# Patient Record
Sex: MALE | Race: BLACK OR AFRICAN AMERICAN
[De-identification: names, ages, dates, MRNs, and addresses within clinical notes are randomized per-mention and may not be internally consistent; named-entity substitution may affect disease eponyms.]

---

## 2017-05-30 ENCOUNTER — HOSPITAL ENCOUNTER (EMERGENCY)
Dept: HOSPITAL 62 - ER | Age: 32
LOS: 1 days | Discharge: HOME | End: 2017-05-31
Payer: SELF-PAY

## 2017-05-30 DIAGNOSIS — G43.A1: ICD-10-CM

## 2017-05-30 DIAGNOSIS — F10.10: Primary | ICD-10-CM

## 2017-05-30 DIAGNOSIS — R10.9: ICD-10-CM

## 2017-05-30 DIAGNOSIS — K29.20: ICD-10-CM

## 2017-05-30 DIAGNOSIS — R19.7: ICD-10-CM

## 2017-05-30 PROCEDURE — 99284 EMERGENCY DEPT VISIT MOD MDM: CPT

## 2017-05-30 PROCEDURE — 80053 COMPREHEN METABOLIC PANEL: CPT

## 2017-05-30 PROCEDURE — 82150 ASSAY OF AMYLASE: CPT

## 2017-05-30 PROCEDURE — 83690 ASSAY OF LIPASE: CPT

## 2017-05-30 PROCEDURE — 85025 COMPLETE CBC W/AUTO DIFF WBC: CPT

## 2017-05-30 PROCEDURE — 36415 COLL VENOUS BLD VENIPUNCTURE: CPT

## 2017-05-30 PROCEDURE — 96375 TX/PRO/DX INJ NEW DRUG ADDON: CPT

## 2017-05-30 PROCEDURE — 96361 HYDRATE IV INFUSION ADD-ON: CPT

## 2017-05-30 PROCEDURE — 96365 THER/PROPH/DIAG IV INF INIT: CPT

## 2017-05-30 PROCEDURE — 80307 DRUG TEST PRSMV CHEM ANLYZR: CPT

## 2017-05-30 PROCEDURE — 81001 URINALYSIS AUTO W/SCOPE: CPT

## 2017-05-30 PROCEDURE — S0164 INJECTION PANTROPRAZOLE: HCPCS

## 2017-05-30 NOTE — ER DOCUMENT REPORT
ED General





- General


Chief Complaint: Nausea/Vomiting/Diarrhea


Stated Complaint: NAUSEA,VOMITING,ABDOMINAL PAIN


Time Seen by Provider: 05/30/17 22:29


Mode of Arrival: Ambulatory


Information source: Patient, Relative


TRAVEL OUTSIDE OF THE [place] IN LAST 30 DAYS: No





- HPI


Notes: 


Patient is a 31-year-old black male history of alcohol abuse drinks a sixpack 

plus hard liquor every day, has a history of recurrent vomiting and gastritis 

presents emergency department with report of persistent nausea and vomiting 

since earlier today.  He does describe mild nonbloody diarrhea.  He reports no 

hematemesis.  He states he has diffuse upper abdominal pain.  He states he has 

been seen previous times for the same, most recently down in [place] and 

at an urgent care center.  The patient states she was given Phenergan and 

ibuprofen for pain.


Patient denies any cough congestion chest pain


Or fever.  He describes generalized weakness with sensation of dehydration.





Patient states he drinks daily, but denies history of DTs or shakes or seizures.











- Related Data


Allergies/Adverse Reactions: 


 





No Known Allergies Allergy (Verified 05/30/17 20:01)


 











Past Medical History





- General


Information source: Patient





- Social History


Smoking Status: Current Every Day Smoker


Frequency of alcohol use: Heavy


Drug Abuse: Marijuana


Lives with: Family


Family History: Reviewed & Not Pertinent


Neurological Medical History: Denies: Hx Seizures


Renal/ Medical History: Denies: Hx Peritoneal Dialysis





- Immunizations


Immunizations up to date: Yes


Hx Diphtheria, Pertussis, Tetanus Vaccination: Yes





Review of Systems





- Review of Systems


Notes: 


REVIEW OF SYSTEMS:


CONSTITUTIONAL :  Denies fever,  chills, or sweats.  


EENT:   Denies eye, ear, throat, or mouth pain or symptoms.  Denies nasal or 

sinus congestion or discharge.  Denies throat, tongue, or mouth swelling or 

difficulty swallowing.


CARDIOVASCULAR:  Denies chest pain.  Denies palpitations or racing or irregular 

heart beat.  Denies ankle edema.


RESPIRATORY:  Denies cough, cold, or chest congestion.  Denies shortness of 

breath, difficulty breathing, or wheezing.


GASTROINTESTINAL:   Denies blood in vomitus, stools, or per rectum.  Denies 

black, tarry stools.  Denies constipation.  


GENITOURINARY:  Denies difficulty urinating, painful urination, burning, 

frequency, blood in urine, or discharge.


MUSCULOSKELETAL:  Denies back or neck pain or stiffness.  Denies joint pain or 

swelling.


SKIN:   Denies rash, lesions or sores.


HEMATOLOGIC :   Denies easy bruising or bleeding.


LYMPHATIC:  Denies swollen, enlarged glands.


NEUROLOGICAL:  Denies confusion or altered mental status.  Denies passing out 

or loss of consciousness.  Denies dizziness or lightheadedness.  Denies 

headache.  Denies weakness or paralysis or loss of use of either side.  Denies 

problems with gait or speech.  Denies sensory loss, numbness, or tingling.  

Denies seizures.


PSYCHIATRIC:  Denies anxiety or stress.  Denies depression, suicidal ideation, 

or homicidal ideation.





ALL OTHER SYSTEMS REVIEWED AND NEGATIVE.





Dictation was performed using Dragon voice recognition software





Physical Exam





- Vital signs


Vitals: 


 











Temp Pulse BP Pulse Ox


 


 98.0 F   77   125/83   100 


 


 05/30/17 20:04  05/30/17 20:04  05/30/17 20:04  05/30/17 20:04














- Notes


Notes: 


PHYSICAL EXAMINATION:





GENERAL: Well-appearing, well-nourished and in no moderate distress vomiting 

actively without hematemesis..





HEAD: Atraumatic, normocephalic.





EYES: Pupils equal round and reactive to light, extraocular movements intact, 

sclera anicteric, conjunctiva are normal.





ENT: Nares patent, oropharynx clear without exudates.  Mucous membranes 

somewhat dry.





NECK: Normal range of motion, supple without lymphadenopathy





LUNGS: Breath sounds clear to auscultation bilaterally and equal.  No wheezes 

rales or rhonchi.





HEART: Regular rate and rhythm without murmurs





ABDOMEN: Soft, nondistended abdomen.  No guarding, no rebound.  No masses 

appreciated.  Upper midline abdominal tenderness noted on exam.  No obvious 

hepatosplenomegaly no pulsatile mass.





Musculoskeletal: Normal range of motion, no pitting or edema.  No cyanosis.





NEUROLOGICAL: Cranial nerves grossly intact.  Normal speech, normal gait.  

Normal sensory, motor exams 





PSYCH: Normal mood, normal affect.





SKIN: Warm, Dry, normal turgor, no rashes or lesions noted.





Course





- Re-evaluation


Re-evalutation: 


05/30/17 23:01


Patient was given IV normal saline bolus, Zofran, Protonix, Dilaudid, Ativan.





05/31/17 03:55





Patient was given additional IV normal saline with 20 KCl 1 L.  Patient was 

given Pepcid and Carafate by mouth with adequate relief of his pain.  Repeat 

abdominal exam showed no significant pain and patient was able to tolerate p.o. 

fluids without difficulty.  There was no evidence for DTs or alcohol 

withdrawal.  No evidence for pancreatitis, hepatitis, renal insufficiency, GI 

bleed.


05/31/17 03:59








- Vital Signs


Vital signs: 


 











Temp Pulse Resp BP Pulse Ox


 


 97.9 F   75   16   117/64   97 


 


 05/31/17 02:34  05/31/17 02:34  05/31/17 02:34  05/31/17 02:34  05/31/17 02:34














- Laboratory


Result Diagrams: 


 05/31/17 00:20





 05/31/17 00:20


Laboratory results interpreted by me: 


 











  05/31/17 05/31/17 05/31/17





  00:20 00:20 00:20


 


Seg Neuts % (Manual)  92 H  


 


Lymphocytes % (Manual)  5 L  


 


Abs Neuts (Manual)  8.3 H  


 


Sodium   145.9 H 


 


Potassium   3.4 L 


 


Glucose   120 H 


 


Calcium   10.6 H 


 


Total Protein   9.1 H 


 


Albumin   5.3 H 


 


Urine Protein    30 H


 


Urine Ketones    80 H


 


Ur Leukocyte Esterase    TRACE H














Discharge





- Discharge


Clinical Impression: 


 Alcohol abuse





Gastritis


Qualifiers:


 Gastritis type: alcoholic Chronicity: chronic Gastritis bleeding: without 

bleeding Qualified Code(s): K29.20 - Alcoholic gastritis without bleeding





Vomiting


Qualifiers:


 Vomiting type: cyclical vomiting Vomiting Intractability: intractable Nausea 

presence: with nausea Qualified Code(s): G43.A1 - Cyclical vomiting, intractable





Condition: Stable


Disposition: HOME, SELF-CARE


Instructions:  Antinausea Medication ([facility]), Intravenous (IV) Fluids ([facility]), 

Vomiting ([facility]), Gastritis ([facility])


Additional Instructions: 


Stop ibuprofen   And other anti-inflammatory medications, as they are 

irritating her stomach.








Prescriptions: 


Tramadol HCl [Ultram] 50 mg PO Q4HP PRN #20 tablet


 PRN Reason: 


Ondansetron [Zofran Odt 4 mg Tablet] 1 tab PO Q8HP PRN #10 tab.rapdis


 PRN Reason: For Nausea/Vomiting


Omeprazole 40 mg PO DAILY #60 capsule.

## 2017-05-31 VITALS — SYSTOLIC BLOOD PRESSURE: 108 MMHG | DIASTOLIC BLOOD PRESSURE: 69 MMHG

## 2017-05-31 LAB
ALBUMIN SERPL-MCNC: 5.3 G/DL (ref 3.5–5)
ALP SERPL-CCNC: 68 U/L (ref 38–126)
ALT SERPL-CCNC: 31 U/L (ref 21–72)
AMYLASE SERPL-CCNC: 75 U/L (ref 30–110)
ANION GAP SERPL CALC-SCNC: 18 MMOL/L (ref 5–19)
APPEARANCE UR: (no result)
AST SERPL-CCNC: 40 U/L (ref 17–59)
BARBITURATES UR QL SCN: NEGATIVE
BASOPHILS NFR BLD MANUAL: 0 % (ref 0–2)
BILIRUB DIRECT SERPL-MCNC: 0.4 MG/DL (ref 0–0.4)
BILIRUB SERPL-MCNC: 0.9 MG/DL (ref 0.2–1.3)
BILIRUB UR QL STRIP: NEGATIVE
BUN SERPL-MCNC: 11 MG/DL (ref 7–20)
CALCIUM: 10.6 MG/DL (ref 8.4–10.2)
CHLORIDE SERPL-SCNC: 103 MMOL/L (ref 98–107)
CO2 SERPL-SCNC: 25 MMOL/L (ref 22–30)
CREAT SERPL-MCNC: 0.71 MG/DL (ref 0.52–1.25)
EOSINOPHIL NFR BLD MANUAL: 0 % (ref 0–6)
ERYTHROCYTE [DISTWIDTH] IN BLOOD BY AUTOMATED COUNT: 13.7 % (ref 11.5–14)
ETHANOL SERPL-MCNC: < 10 MG/DL
GLUCOSE SERPL-MCNC: 120 MG/DL (ref 75–110)
GLUCOSE UR STRIP-MCNC: NEGATIVE MG/DL
HCT VFR BLD CALC: 42.2 % (ref 37.9–51)
HGB BLD-MCNC: 14 G/DL (ref 13.5–17)
HGB HCT DIFFERENCE: -0.2
KETONES UR STRIP-MCNC: 80 MG/DL
LIPASE SERPL-CCNC: 43.5 U/L (ref 23–300)
MCH RBC QN AUTO: 31.3 PG (ref 27–33.4)
MCHC RBC AUTO-ENTMCNC: 33.2 G/DL (ref 32–36)
MCV RBC AUTO: 94 FL (ref 80–97)
METHADONE UR QL SCN: NEGATIVE
NITRITE UR QL STRIP: NEGATIVE
PCP UR QL SCN: NEGATIVE
PH UR STRIP: 7 [PH] (ref 5–9)
POTASSIUM SERPL-SCNC: 3.4 MMOL/L (ref 3.6–5)
PROT SERPL-MCNC: 9.1 G/DL (ref 6.3–8.2)
PROT UR STRIP-MCNC: 30 MG/DL
RBC # BLD AUTO: 4.47 10^6/UL (ref 4.35–5.55)
RBC MORPH BLD: (no result)
SODIUM SERPL-SCNC: 145.9 MMOL/L (ref 137–145)
SP GR UR STRIP: 1.03
TOTAL CELLS COUNTED BLD: 100
TOXIC GRANULES BLD QL SMEAR: SLIGHT
URINE OPIATES LOW: NEGATIVE
UROBILINOGEN UR-MCNC: NEGATIVE MG/DL (ref ?–2)
VARIANT LYMPHS NFR BLD MANUAL: 5 % (ref 13–45)
WBC # BLD AUTO: 9 10^3/UL (ref 4–10.5)

## 2017-06-01 ENCOUNTER — HOSPITAL ENCOUNTER (EMERGENCY)
Dept: HOSPITAL 62 - ER | Age: 32
Discharge: HOME | End: 2017-06-01
Payer: SELF-PAY

## 2017-06-01 VITALS — DIASTOLIC BLOOD PRESSURE: 86 MMHG | SYSTOLIC BLOOD PRESSURE: 126 MMHG

## 2017-06-01 DIAGNOSIS — F17.200: ICD-10-CM

## 2017-06-01 DIAGNOSIS — G43.A0: Primary | ICD-10-CM

## 2017-06-01 DIAGNOSIS — R10.9: ICD-10-CM

## 2017-06-01 LAB
ALBUMIN SERPL-MCNC: 4.4 G/DL (ref 3.5–5)
ALP SERPL-CCNC: 55 U/L (ref 38–126)
ALT SERPL-CCNC: 32 U/L (ref 21–72)
ANION GAP SERPL CALC-SCNC: 15 MMOL/L (ref 5–19)
APPEARANCE UR: CLEAR
AST SERPL-CCNC: 24 U/L (ref 17–59)
BASOPHILS # BLD AUTO: 0 10^3/UL (ref 0–0.2)
BASOPHILS NFR BLD AUTO: 0.3 % (ref 0–2)
BILIRUB DIRECT SERPL-MCNC: 0.3 MG/DL (ref 0–0.4)
BILIRUB SERPL-MCNC: 0.6 MG/DL (ref 0.2–1.3)
BILIRUB UR QL STRIP: NEGATIVE
BUN SERPL-MCNC: 9 MG/DL (ref 7–20)
CALCIUM: 9.4 MG/DL (ref 8.4–10.2)
CHLORIDE SERPL-SCNC: 106 MMOL/L (ref 98–107)
CO2 SERPL-SCNC: 26 MMOL/L (ref 22–30)
CREAT SERPL-MCNC: 0.73 MG/DL (ref 0.52–1.25)
EOSINOPHIL # BLD AUTO: 0 10^3/UL (ref 0–0.6)
EOSINOPHIL NFR BLD AUTO: 0 % (ref 0–6)
ERYTHROCYTE [DISTWIDTH] IN BLOOD BY AUTOMATED COUNT: 13.5 % (ref 11.5–14)
GLUCOSE SERPL-MCNC: 92 MG/DL (ref 75–110)
GLUCOSE UR STRIP-MCNC: NEGATIVE MG/DL
HCT VFR BLD CALC: 40.6 % (ref 37.9–51)
HGB BLD-MCNC: 13.4 G/DL (ref 13.5–17)
HGB HCT DIFFERENCE: -0.4
KETONES UR STRIP-MCNC: (no result) MG/DL
LIPASE SERPL-CCNC: 68.7 U/L (ref 23–300)
LYMPHOCYTES # BLD AUTO: 0.8 10^3/UL (ref 0.5–4.7)
LYMPHOCYTES NFR BLD AUTO: 9 % (ref 13–45)
MCH RBC QN AUTO: 31.6 PG (ref 27–33.4)
MCHC RBC AUTO-ENTMCNC: 32.9 G/DL (ref 32–36)
MCV RBC AUTO: 96 FL (ref 80–97)
MONOCYTES # BLD AUTO: 0.5 10^3/UL (ref 0.1–1.4)
MONOCYTES NFR BLD AUTO: 5.8 % (ref 3–13)
NEUTROPHILS # BLD AUTO: 7.1 10^3/UL (ref 1.7–8.2)
NEUTS SEG NFR BLD AUTO: 84.9 % (ref 42–78)
NITRITE UR QL STRIP: NEGATIVE
PH UR STRIP: 6 [PH] (ref 5–9)
POTASSIUM SERPL-SCNC: 3.5 MMOL/L (ref 3.6–5)
PROT SERPL-MCNC: 7.4 G/DL (ref 6.3–8.2)
PROT UR STRIP-MCNC: NEGATIVE MG/DL
RBC # BLD AUTO: 4.24 10^6/UL (ref 4.35–5.55)
SODIUM SERPL-SCNC: 146.5 MMOL/L (ref 137–145)
SP GR UR STRIP: 1.02
UROBILINOGEN UR-MCNC: NEGATIVE MG/DL (ref ?–2)
WBC # BLD AUTO: 8.4 10^3/UL (ref 4–10.5)

## 2017-06-01 PROCEDURE — 36415 COLL VENOUS BLD VENIPUNCTURE: CPT

## 2017-06-01 PROCEDURE — 83690 ASSAY OF LIPASE: CPT

## 2017-06-01 PROCEDURE — 85025 COMPLETE CBC W/AUTO DIFF WBC: CPT

## 2017-06-01 PROCEDURE — 99283 EMERGENCY DEPT VISIT LOW MDM: CPT

## 2017-06-01 PROCEDURE — 80053 COMPREHEN METABOLIC PANEL: CPT

## 2017-06-01 PROCEDURE — 81001 URINALYSIS AUTO W/SCOPE: CPT

## 2017-06-01 PROCEDURE — 96365 THER/PROPH/DIAG IV INF INIT: CPT

## 2017-06-01 PROCEDURE — 96375 TX/PRO/DX INJ NEW DRUG ADDON: CPT

## 2017-06-01 PROCEDURE — 96361 HYDRATE IV INFUSION ADD-ON: CPT

## 2017-06-01 NOTE — ER DOCUMENT REPORT
ED GI/





- General


Mode of Arrival: Ambulatory


Information source: Patient


TRAVEL OUTSIDE OF THE [place] IN LAST 30 DAYS: No





- HPI


Patient complains to provider of: Abdominal pain, Vomiting


Onset: Other - Refer to HPI notes


Associated symptoms: Nausea


Similar symptoms previously: No


Recently seen / treated by doctor: No





<JOANN JARRELL - Last Filed: 06/01/17 12:10>





<JOSH WALDEN - Last Filed: 06/01/17 16:43>





- General


Chief Complaint: Abdominal Pain


Stated Complaint: NAUSEA


Time Seen by Provider: 06/01/17 11:04


Notes: 


Patient is a 31-year-old male presenting to the emergency department for an 

ongoing episode of nausea, vomiting, and abdominal pain.  Patient states that 

the episode started on Tuesday (5/30/17) and patient was evaluated [facility] emergency department on Tuesday night (5/30/17).  Patient 

tested positive for marijuana use on 5/30/17.  Patient states that these 

vomiting and abdominal pain episodes happen frequently. Patient has been told 

that his marijuana use may be the cause of these episodes. Cyclic vomiting 

syndrome was discussed in detail with patient and patient's significant other 

at the bedside. Patient also drinks EtOH heavily as well as smokes cigarettes. 

Patient has no known drug allergies. (JOANN JARRELL)





- Related Data


Allergies/Adverse Reactions: 


 





No Known Allergies Allergy (Verified 06/01/17 10:45)


 











Past Medical History





- General


Information source: Patient





- Social History


Smoking Status: Current Every Day Smoker


Chew tobacco use (# tins/day): No


Frequency of alcohol use: Heavy


Drug Abuse: Marijuana


Family History: None


Patient has suicidal ideation: No


Patient has homicidal ideation: No





- Medical History


Medical History: Negative


Surgical Hx: Negative





- Immunizations


Immunizations up to date: Yes


Hx Diphtheria, Pertussis, Tetanus Vaccination: Yes





<JOANN JARRELL - Last Filed: 06/01/17 12:10>





Review of Systems





- Review of Systems


Constitutional: No symptoms reported


EENT: No symptoms reported


Cardiovascular: No symptoms reported


Respiratory: No symptoms reported


Gastrointestinal: See HPI, Abdominal pain, Nausea, Vomiting


Genitourinary: No symptoms reported


Male Genitourinary: No symptoms reported


Musculoskeletal: No symptoms reported


Skin: No symptoms reported


Hematologic/Lymphatic: No symptoms reported


Neurological/Psychological: No symptoms reported


-: Yes All other systems reviewed and negative





<JOANN JARRELL - Last Filed: 06/01/17 12:10>





Physical Exam





- Vital signs


Interpretation: Normal





- General


General appearance: Appears well, Alert


In distress: Mild





- HEENT


Head: Normocephalic, Atraumatic


Eyes: Normal


Pupils: PERRL


Mucous membranes: Moist





- Respiratory


Respiratory status: No respiratory distress


Chest status: Nontender


Breath sounds: Normal


Chest palpation: Normal





- Cardiovascular


Rhythm: Regular


Heart sounds: Normal auscultation


Murmur: No





- Abdominal


Inspection: Normal


Distension: No distension


Bowel sounds: Normal


Tenderness: Guarding


Organomegaly: No organomegaly





- Back


Back: Normal, Nontender





- Extremities


General upper extremity: Normal inspection, Normal ROM, Normal strength


General lower extremity: Normal inspection, Normal ROM, Normal strength





- Neurological


Neuro grossly intact: Yes


Cognition: Normal


Orientation: AAOx4


[place]io Coma Scale Eye Opening: Spontaneous


Ambrocio Coma Scale Verbal: Oriented


[place]rocio Coma Scale Motor: Obeys Commands


[place] Coma Scale Total: 15


Speech: Normal





- Psychological


Associated symptoms: Normal affect, Normal mood





- Skin


Skin Temperature: Warm


Skin Moisture: Dry





<NENONEOJOANN - Last Filed: 06/01/17 12:10>





<JOSH WALDEN - Last Filed: 06/01/17 16:43>





- Vital signs


Vitals: 


 











Temp Pulse Resp BP Pulse Ox


 


 98.1 F   84   20   149/101 H  99 


 


 06/01/17 10:45  06/01/17 10:45  06/01/17 10:45  06/01/17 10:45  06/01/17 10:45














Course





<NOLAN JARRELLINE - Last Filed: 06/01/17 12:10>





- Laboratory


Result Diagrams: 


 06/01/17 13:57





 06/01/17 13:57





<JOSH WALDEN - Last Filed: 06/01/17 16:43>





- Re-evaluation


Re-evalutation: 





06/01/17 16:21


After the Reglan and Benadryl, the patient feels considerably better.  IV 

fluids are infusing.  Of note, his urine did show calcium oxalate crystals and 

he was counseled about the need to hydrate well throughout the year to prevent 

forming kidney stones.


Discussed the need to stop using marijuana as this is probably cyclic vomiting 

related to marijuana use, and it will probably get worse over time. (JOSH WALDEN)





- Vital Signs


Vital signs: 


 











Temp Pulse Resp BP Pulse Ox


 


 98.1 F   85   20   149/101 H  100 


 


 06/01/17 10:46  06/01/17 10:46  06/01/17 10:46  06/01/17 10:46  06/01/17 10:46














- Laboratory


Laboratory results interpreted by me: 


 











  06/01/17 06/01/17 06/01/17





  13:19 13:57 13:57


 


RBC   4.24 L 


 


Hgb   13.4 L 


 


Seg Neutrophils %   84.9 H 


 


Lymphocytes %   9.0 L 


 


Sodium    146.5 H


 


Potassium    3.5 L


 


Urine Ketones  TRACE H  


 


Urine Blood  SMALL H  














Discharge





<JOANN JARRELL - Last Filed: 06/01/17 12:10>





<JOSH WALDEN - Last Filed: 06/01/17 16:43>





- Discharge


Clinical Impression: 


Cyclic vomiting syndrome


Qualifiers:


 Vomiting Intractability: non-intractable Nausea presence: with nausea 

Qualified Code(s): G43.A0 - Cyclical vomiting, not intractable





Condition: Stable


Disposition: HOME, SELF-CARE


Additional Instructions: 


Vomiting:





     Vomiting can be part of many illnesses.  Most cases of vomiting are due to 

gastroenteritis, usually a viral infection in the intestinal tract.  There is 

no specific treatment.  The disease will end by itself.  For now, the main 

danger to your child is dehydration.


     During the first few hours of the illness, give clear liquids, such as 

Pedialyte.  Try to give small quantities frequently, such as a teaspoon of 

liquid every minute or about an ounce of fluids every five to ten minutes.


     Medications may be prescribed by the physician for special cases.  After 

an hour or two of fluids without vomiting, add rice cereal, toast, applesauce, 

or bananas and other more solid foods to the clear liquids.


     Call the physician or go to the hospital if vomiting increases or blood 

appears in the bowel movement or vomitus; if your child fails to improve, or if 

signs of dehydration occur (no wet diapers for eight to twelve hours, tongue 

and mouth become dry, not acting as alert as usual).





////////////////////////////////////////////////////////////////////////////////

///////////////////////////////////////////////////////////





Your vomiting is quite likely "cyclic vomiting" due to marijuana use.


Take the Reglan as prescribed for nausea.  Take one Benadryl with the Reglan 

for additional nausea control.


Drink plenty of cool clear liquids.


Stop using marijuana.


Follow-up with a local medical doctor if not improving.





RETURN TO THE EMERGENCY ROOM IF ANY NEW OR WORSENING SYMPTOMS.








Prescriptions: 


Metoclopramide HCl [Reglan 10 mg Tablet] 10 mg PO Q4 PRN #20 tablet


 PRN Reason: For Nausea/Vomiting


Scribe Attestation: 





06/01/17 16:43


I personally performed the services described in the documentation, reviewed 

and edited the documentation which was dictated to the scribe in my presence, 

and it accurately records my words and actions. (JOSH WALDEN)





Scribe Documentation





- Scribe


Written by Arik:: Arik Fernandez, 6/1/17 12:19


acting as scribe for :: Tegan





<JOANN JARRELL - Last Filed: 06/01/17 12:10>

## 2017-06-01 NOTE — ER DOCUMENT REPORT
ED Medical Screen (RME)





- General


Chief Complaint: Abdominal Pain


Stated Complaint: NAUSEA


Time Seen by Provider: 06/01/17 11:04


Mode of Arrival: Ambulatory


Information source: Patient


Notes: 


Patient presents to the emergency department actively vomiting.  Patient was 

evaluated and treated here on May 30 for same symptoms.  He did not  his 

prescriptions.  He reports he still vomiting.


TRAVEL OUTSIDE OF THE [place] IN LAST 30 DAYS: No





- Related Data


Allergies/Adverse Reactions: 


 





No Known Allergies Allergy (Verified 06/01/17 10:45)


 











Past Medical History


Neurological Medical History: Denies: Hx Seizures


Renal/ Medical History: Denies: Hx Peritoneal Dialysis





- Immunizations


Immunizations up to date: Yes


Hx Diphtheria, Pertussis, Tetanus Vaccination: Yes





Physical Exam





- Vital signs


Vitals: 





 











Temp Pulse Resp BP Pulse Ox


 


 98.1 F   84   20   149/101 H  99 


 


 06/01/17 10:45  06/01/17 10:45  06/01/17 10:45  06/01/17 10:45  06/01/17 10:45














Course





- Vital Signs


Vital signs: 





 











Temp Pulse Resp BP Pulse Ox


 


 98.1 F   84   20   149/101 H  99 


 


 06/01/17 10:45  06/01/17 10:45  06/01/17 10:45  06/01/17 10:45  06/01/17 10:45

## 2017-06-02 ENCOUNTER — HOSPITAL ENCOUNTER (EMERGENCY)
Dept: HOSPITAL 62 - ER | Age: 32
Discharge: HOME | End: 2017-06-02
Payer: SELF-PAY

## 2017-06-02 VITALS — DIASTOLIC BLOOD PRESSURE: 89 MMHG | SYSTOLIC BLOOD PRESSURE: 136 MMHG

## 2017-06-02 DIAGNOSIS — F10.10: ICD-10-CM

## 2017-06-02 DIAGNOSIS — F17.200: ICD-10-CM

## 2017-06-02 DIAGNOSIS — G43.A1: Primary | ICD-10-CM

## 2017-06-02 DIAGNOSIS — R10.9: ICD-10-CM

## 2017-06-02 LAB
ANION GAP SERPL CALC-SCNC: 15 MMOL/L (ref 5–19)
APPEARANCE UR: CLEAR
BASOPHILS # BLD AUTO: 0 10^3/UL (ref 0–0.2)
BASOPHILS NFR BLD AUTO: 0.3 % (ref 0–2)
BILIRUB UR QL STRIP: NEGATIVE
BUN SERPL-MCNC: 3 MG/DL (ref 7–20)
CALCIUM: 9.7 MG/DL (ref 8.4–10.2)
CHLORIDE SERPL-SCNC: 103 MMOL/L (ref 98–107)
CO2 SERPL-SCNC: 24 MMOL/L (ref 22–30)
CREAT SERPL-MCNC: 0.69 MG/DL (ref 0.52–1.25)
EOSINOPHIL # BLD AUTO: 0 10^3/UL (ref 0–0.6)
EOSINOPHIL NFR BLD AUTO: 0.2 % (ref 0–6)
ERYTHROCYTE [DISTWIDTH] IN BLOOD BY AUTOMATED COUNT: 13.5 % (ref 11.5–14)
GLUCOSE SERPL-MCNC: 100 MG/DL (ref 75–110)
GLUCOSE UR STRIP-MCNC: NEGATIVE MG/DL
HCT VFR BLD CALC: 36.2 % (ref 37.9–51)
HGB BLD-MCNC: 12.1 G/DL (ref 13.5–17)
HGB HCT DIFFERENCE: 0.1
KETONES UR STRIP-MCNC: NEGATIVE MG/DL
LIPASE SERPL-CCNC: 60.9 U/L (ref 23–300)
LYMPHOCYTES # BLD AUTO: 0.9 10^3/UL (ref 0.5–4.7)
LYMPHOCYTES NFR BLD AUTO: 14.5 % (ref 13–45)
MCH RBC QN AUTO: 32 PG (ref 27–33.4)
MCHC RBC AUTO-ENTMCNC: 33.4 G/DL (ref 32–36)
MCV RBC AUTO: 96 FL (ref 80–97)
MONOCYTES # BLD AUTO: 0.5 10^3/UL (ref 0.1–1.4)
MONOCYTES NFR BLD AUTO: 8.3 % (ref 3–13)
NEUTROPHILS # BLD AUTO: 5 10^3/UL (ref 1.7–8.2)
NEUTS SEG NFR BLD AUTO: 76.7 % (ref 42–78)
NITRITE UR QL STRIP: NEGATIVE
PH UR STRIP: 7 [PH] (ref 5–9)
POTASSIUM SERPL-SCNC: 3.1 MMOL/L (ref 3.6–5)
PROT UR STRIP-MCNC: NEGATIVE MG/DL
RBC # BLD AUTO: 3.78 10^6/UL (ref 4.35–5.55)
SODIUM SERPL-SCNC: 142.3 MMOL/L (ref 137–145)
SP GR UR STRIP: 1.01
UROBILINOGEN UR-MCNC: NEGATIVE MG/DL (ref ?–2)
WBC # BLD AUTO: 6.5 10^3/UL (ref 4–10.5)

## 2017-06-02 PROCEDURE — 80048 BASIC METABOLIC PNL TOTAL CA: CPT

## 2017-06-02 PROCEDURE — 85025 COMPLETE CBC W/AUTO DIFF WBC: CPT

## 2017-06-02 PROCEDURE — 74000: CPT

## 2017-06-02 PROCEDURE — 36415 COLL VENOUS BLD VENIPUNCTURE: CPT

## 2017-06-02 PROCEDURE — S0119 ONDANSETRON 4 MG: HCPCS

## 2017-06-02 PROCEDURE — 96374 THER/PROPH/DIAG INJ IV PUSH: CPT

## 2017-06-02 PROCEDURE — 83690 ASSAY OF LIPASE: CPT

## 2017-06-02 PROCEDURE — 99284 EMERGENCY DEPT VISIT MOD MDM: CPT

## 2017-06-02 PROCEDURE — 96372 THER/PROPH/DIAG INJ SC/IM: CPT

## 2017-06-02 PROCEDURE — 81001 URINALYSIS AUTO W/SCOPE: CPT

## 2017-06-02 PROCEDURE — 80307 DRUG TEST PRSMV CHEM ANLYZR: CPT

## 2017-06-02 NOTE — ER DOCUMENT REPORT
ED GI/





- General


Chief Complaint: Abdominal Pain


Stated Complaint: ABDOMINAL PAIN


Time Seen by Provider: 06/02/17 03:26


Notes: 


patient is 31 year old male who presents with abdominal pain he has been 

evaluated in the emergency department 3 times over the past couple of days for 

cyclic vomiting and alcohol abuse.  Been noncompliant with clear liquid diet, 

ceasing from drinking alcohol or smoking marijuana.  Patient states has not 

been taking his medications at home.  He is also here today for referral for a 

detox program


TRAVEL OUTSIDE OF THE [place] IN LAST 30 DAYS: No





- Related Data


Allergies/Adverse Reactions: 


 





No Known Allergies Allergy (Verified 06/01/17 10:45)


 











Past Medical History





- Social History


Smoking Status: Current Every Day Smoker


Drug Abuse: Marijuana


Family History: None


Patient has suicidal ideation: No


Patient has homicidal ideation: No


Neurological Medical History: Denies: Hx Seizures


Renal/ Medical History: Denies: Hx Peritoneal Dialysis





- Immunizations


Immunizations up to date: Yes


Hx Diphtheria, Pertussis, Tetanus Vaccination: Yes





Review of Systems





- Review of Systems


Constitutional: No symptoms reported


Gastrointestinal: Abdominal pain, Nausea, Vomiting


-: Yes All other systems reviewed and negative





Physical Exam





- Vital signs


Vitals: 


 











Temp Pulse Resp BP Pulse Ox


 


 97.8 F   79   16   167/99 H  100 


 


 06/02/17 02:43  06/02/17 02:43  06/02/17 02:43  06/02/17 02:43  06/02/17 02:43














- Notes


Notes: 


PHYSICAL EXAM


GENERAL: Alert, interacts well. 


HEAD: Normocephalic, atraumatic.


EYES: Pupils equal, round, and reactive to light. Extraocular movements intact.


ENT: Oral mucosa moist, tongue midline. 


NECK: Full range of motion. Supple. Trachea midline.


LUNGS: Clear to auscultation bilaterally, no wheezes, rales, or rhonchi. No 

respiratory distress.


HEART: Regular rate and rhythm. No murmurs, gallops, or rubs.


ABDOMEN: Soft,  nondistended, throughout with guarding. No rebound, or 

rigidity.. Bowel sounds present in all 4 quadrants.


EXTREMITIES: Moves all 4 extremities spontaneously. No edema, radial and 

dorsalis pedis pulses 2/4 bilaterally. No cyanosis. 


NEUROLOGICAL: Alert and oriented x4. Normal speech.


PSYCH: Normal affect, normal mood.


SKIN: Warm, dry, normal turgor. No rashes or lesions noted.








Course





- Re-evaluation


Re-evalutation: 


06/02/17 07:43


Patient is a 31-year-old male who presents with cyclic vomiting after chronic 

alcohol and marijuana use.  Labs do not reveal any concern for acute dehydration

, infection.  Patient treated for cannabinoid hyperemesis.  Resources for 

outpatient alcohol treatment.  Patient agrees with treatment plan





Per [facility] protocol and guidelines, this case was discussed with supervising 

physician Dr. BJ Alejandre prior to discharge





- Vital Signs


Vital signs: 


 











Temp Pulse Resp BP Pulse Ox


 


 97.8 F   79   16   167/99 H  100 


 


 06/02/17 02:43  06/02/17 02:43  06/02/17 02:43  06/02/17 02:43  06/02/17 02:43














- Laboratory


Result Diagrams: 


 06/02/17 04:15





 06/02/17 04:15


Laboratory results interpreted by me: 


 











  06/02/17 06/02/17





  04:15 04:15


 


RBC  3.78 L 


 


Hgb  12.1 L 


 


Hct  36.2 L 


 


Potassium   3.1 L


 


BUN   3 L














- Diagnostic Test


Radiology reviewed: Image reviewed, Reports reviewed





Discharge





- Discharge


Clinical Impression: 


 Alcohol abuse





Vomiting


Qualifiers:


 Vomiting type: cyclical vomiting Vomiting Intractability: intractable Nausea 

presence: with nausea Qualified Code(s): G43.A1 - Cyclical vomiting, intractable





Condition: Good


Disposition: HOME, SELF-CARE


Instructions:  Vomiting ([facility]), Chronic Alcoholism ([facility])


Additional Instructions: 


Your vomiting is quite likely "cyclic vomiting" due to marijuana use.


Take the Reglan as prescribed for nausea.  Take one Benadryl with the Reglan 

for additional nausea control.


Drink plenty of cool clear liquids.


Stop using marijuana.


Follow-up with a local medical doctor if not improving.








Forms:  Elevated Blood Pressure


Referrals: 


[facility] [Provider Group] - Follow up as needed

## 2017-06-02 NOTE — RADIOLOGY REPORT (SQ)
EXAM DESCRIPTION:  KUB/ABDOMEN (SINGLE VIEW)



COMPLETED DATE/TIME:  6/2/2017 4:42 am



REASON FOR STUDY:  abdominal pain



COMPARISON:  CT abdomen and pelvis 10/22/2016



NUMBER OF VIEWS:  One view.



TECHNIQUE:   Supine radiographic image of the abdomen acquired.



LIMITATIONS:  None.



FINDINGS:  BOWEL GAS PATTERN: Nonobstructive bowel gas pattern. No dilated loops.

CALCIFICATIONS: No suspicious calcifications.

SOFT TISSUES: No gross mass or suggestion of organomegaly.

HARDWARE: None in the abdomen.

BONES: No acute findings.



IMPRESSION:  Nonobstructive bowel gas pattern.



TECHNICAL DOCUMENTATION:  JOB ID:  [id number]

[id number]

 2011 [employer]- All Rights Reserved

## 2017-06-19 ENCOUNTER — HOSPITAL ENCOUNTER (EMERGENCY)
Dept: HOSPITAL 62 - ER | Age: 32
Discharge: HOME | End: 2017-06-19
Payer: SELF-PAY
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
## 2017-06-19 DIAGNOSIS — S51.811A: ICD-10-CM

## 2017-06-19 DIAGNOSIS — G89.29: ICD-10-CM

## 2017-06-19 DIAGNOSIS — S01.01XA: ICD-10-CM

## 2017-06-19 DIAGNOSIS — R10.9: Primary | ICD-10-CM

## 2017-06-19 DIAGNOSIS — S41.112A: ICD-10-CM

## 2017-06-19 LAB
ALBUMIN SERPL-MCNC: 4.6 G/DL (ref 3.5–5)
ALP SERPL-CCNC: 43 U/L (ref 38–126)
ALT SERPL-CCNC: 51 U/L (ref 21–72)
ANION GAP SERPL CALC-SCNC: 17 MMOL/L (ref 5–19)
APTT BLD: 24.4 SEC (ref 23.5–35.8)
AST SERPL-CCNC: 45 U/L (ref 17–59)
BASOPHILS # BLD AUTO: 0 10^3/UL (ref 0–0.2)
BASOPHILS NFR BLD AUTO: 0.3 % (ref 0–2)
BILIRUB DIRECT SERPL-MCNC: 0.3 MG/DL (ref 0–0.4)
BILIRUB SERPL-MCNC: 0.5 MG/DL (ref 0.2–1.3)
BUN SERPL-MCNC: 11 MG/DL (ref 7–20)
CALCIUM: 9.6 MG/DL (ref 8.4–10.2)
CHLORIDE SERPL-SCNC: 107 MMOL/L (ref 98–107)
CO2 SERPL-SCNC: 21 MMOL/L (ref 22–30)
CREAT SERPL-MCNC: 0.83 MG/DL (ref 0.52–1.25)
EOSINOPHIL # BLD AUTO: 0 10^3/UL (ref 0–0.6)
EOSINOPHIL NFR BLD AUTO: 0.2 % (ref 0–6)
ERYTHROCYTE [DISTWIDTH] IN BLOOD BY AUTOMATED COUNT: 13.4 % (ref 11.5–14)
ETHANOL SERPL-MCNC: 171 MG/DL
GLUCOSE SERPL-MCNC: 98 MG/DL (ref 75–110)
HCT VFR BLD CALC: 39.1 % (ref 37.9–51)
HGB BLD-MCNC: 12.9 G/DL (ref 13.5–17)
HGB HCT DIFFERENCE: -0.4
LYMPHOCYTES # BLD AUTO: 1 10^3/UL (ref 0.5–4.7)
LYMPHOCYTES NFR BLD AUTO: 13.3 % (ref 13–45)
MCH RBC QN AUTO: 31.5 PG (ref 27–33.4)
MCHC RBC AUTO-ENTMCNC: 33.1 G/DL (ref 32–36)
MCV RBC AUTO: 95 FL (ref 80–97)
MONOCYTES # BLD AUTO: 0.6 10^3/UL (ref 0.1–1.4)
MONOCYTES NFR BLD AUTO: 7.7 % (ref 3–13)
NEUTROPHILS # BLD AUTO: 6.1 10^3/UL (ref 1.7–8.2)
NEUTS SEG NFR BLD AUTO: 78.5 % (ref 42–78)
POTASSIUM SERPL-SCNC: 3.9 MMOL/L (ref 3.6–5)
PROT SERPL-MCNC: 7.6 G/DL (ref 6.3–8.2)
PROTHROMBIN TIME: 12.9 SEC (ref 11.4–15.4)
RBC # BLD AUTO: 4.1 10^6/UL (ref 4.35–5.55)
SODIUM SERPL-SCNC: 145 MMOL/L (ref 137–145)
WBC # BLD AUTO: 7.8 10^3/UL (ref 4–10.5)

## 2017-06-19 PROCEDURE — 12004 RPR S/N/AX/GEN/TRK7.6-12.5CM: CPT

## 2017-06-19 PROCEDURE — 13122 CMPLX RPR S/A/L ADDL 5 CM/>: CPT

## 2017-06-19 PROCEDURE — 71010: CPT

## 2017-06-19 PROCEDURE — 0HQ0XZZ REPAIR SCALP SKIN, EXTERNAL APPROACH: ICD-10-PCS | Performed by: EMERGENCY MEDICINE

## 2017-06-19 PROCEDURE — 0KQ90ZZ REPAIR RIGHT LOWER ARM AND WRIST MUSCLE, OPEN APPROACH: ICD-10-PCS | Performed by: EMERGENCY MEDICINE

## 2017-06-19 PROCEDURE — 70450 CT HEAD/BRAIN W/O DYE: CPT

## 2017-06-19 PROCEDURE — 86850 RBC ANTIBODY SCREEN: CPT

## 2017-06-19 PROCEDURE — 99285 EMERGENCY DEPT VISIT HI MDM: CPT

## 2017-06-19 PROCEDURE — 85730 THROMBOPLASTIN TIME PARTIAL: CPT

## 2017-06-19 PROCEDURE — 86900 BLOOD TYPING SEROLOGIC ABO: CPT

## 2017-06-19 PROCEDURE — 80307 DRUG TEST PRSMV CHEM ANLYZR: CPT

## 2017-06-19 PROCEDURE — 85610 PROTHROMBIN TIME: CPT

## 2017-06-19 PROCEDURE — 85025 COMPLETE CBC W/AUTO DIFF WBC: CPT

## 2017-06-19 PROCEDURE — 80053 COMPREHEN METABOLIC PANEL: CPT

## 2017-06-19 PROCEDURE — 73090 X-RAY EXAM OF FOREARM: CPT

## 2017-06-19 PROCEDURE — 86901 BLOOD TYPING SEROLOGIC RH(D): CPT

## 2017-06-19 PROCEDURE — 36415 COLL VENOUS BLD VENIPUNCTURE: CPT

## 2017-06-19 PROCEDURE — L3984 UPPER EXT FX ORTHOSIS WRIST: HCPCS

## 2017-06-19 PROCEDURE — 13121 CMPLX RPR S/A/L 2.6-7.5 CM: CPT

## 2017-06-19 PROCEDURE — 0HQCXZZ REPAIR LEFT UPPER ARM SKIN, EXTERNAL APPROACH: ICD-10-PCS | Performed by: EMERGENCY MEDICINE

## 2017-06-19 NOTE — RADIOLOGY REPORT (SQ)
EXAM DESCRIPTION:  FOREARM RIGHT



COMPLETED DATE/TIME:  6/19/2017 5:39 pm



REASON FOR STUDY:  right forearm injury



COMPARISON:  None.



NUMBER OF VIEWS:  Two views.



TECHNIQUE:  Two radiographic images acquired of the right forearm, including elbow and wrist in at le
ast one projection.



LIMITATIONS:  None.



FINDINGS:  MINERALIZATION: Normal.

BONES: No acute fracture.  No worrisome bone lesions.

SOFT TISSUES: There appears to be a soft tissue injury adjacent to the radius and on the dorsum of th
e 4.  No osseous abnormality is seen.

OTHER: No other significant finding.



IMPRESSION:  No osseous abnormality is present.



TECHNICAL DOCUMENTATION:  JOB ID:  [id number]

 2011 [employer]- All Rights Reserved
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
## 2017-06-22 ENCOUNTER — HOSPITAL ENCOUNTER (EMERGENCY)
Dept: HOSPITAL 62 - ER | Age: 32
Discharge: HOME | End: 2017-06-22
Payer: SELF-PAY

## 2017-06-22 VITALS — DIASTOLIC BLOOD PRESSURE: 91 MMHG | SYSTOLIC BLOOD PRESSURE: 125 MMHG
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
## 2017-06-22 DIAGNOSIS — I10: ICD-10-CM

## 2017-06-22 DIAGNOSIS — L03.113: ICD-10-CM

## 2017-06-22 DIAGNOSIS — S51.811D: ICD-10-CM

## 2017-06-22 DIAGNOSIS — S01.01XD: Primary | ICD-10-CM

## 2017-06-22 PROCEDURE — 99282 EMERGENCY DEPT VISIT SF MDM: CPT

## 2017-06-22 PROCEDURE — 96372 THER/PROPH/DIAG INJ SC/IM: CPT
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
## 2017-12-29 ENCOUNTER — HOSPITAL ENCOUNTER (INPATIENT)
Dept: HOSPITAL 62 - ER | Age: 32
LOS: 6 days | Discharge: HOME | DRG: 392 | End: 2018-01-04
Attending: INTERNAL MEDICINE | Admitting: INTERNAL MEDICINE
Payer: SELF-PAY

## 2017-12-29 DIAGNOSIS — K52.9: ICD-10-CM

## 2017-12-29 DIAGNOSIS — I10: ICD-10-CM

## 2017-12-29 DIAGNOSIS — F17.210: ICD-10-CM

## 2017-12-29 DIAGNOSIS — E87.2: ICD-10-CM

## 2017-12-29 DIAGNOSIS — K51.00: ICD-10-CM

## 2017-12-29 DIAGNOSIS — K29.20: Primary | ICD-10-CM

## 2017-12-29 DIAGNOSIS — F12.10: ICD-10-CM

## 2017-12-29 DIAGNOSIS — F10.229: ICD-10-CM

## 2017-12-29 DIAGNOSIS — K21.9: ICD-10-CM

## 2017-12-29 LAB
ADD MANUAL DIFF: NO
ALBUMIN SERPL-MCNC: 5.6 G/DL (ref 3.5–5)
ALP SERPL-CCNC: 82 U/L (ref 38–126)
ALT SERPL-CCNC: 60 U/L (ref 21–72)
ANION GAP SERPL CALC-SCNC: 19 MMOL/L (ref 5–19)
ANION GAP SERPL CALC-SCNC: 24 MMOL/L (ref 5–19)
ANION GAP SERPL CALC-SCNC: 30 MMOL/L (ref 5–19)
APAP SERPL-MCNC: < 10 UG/ML (ref 10–30)
AST SERPL-CCNC: 153 U/L (ref 17–59)
BARBITURATES UR QL SCN: NEGATIVE
BASOPHILS # BLD AUTO: 0 10^3/UL (ref 0–0.2)
BASOPHILS NFR BLD AUTO: 0.3 % (ref 0–2)
BILIRUB DIRECT SERPL-MCNC: 0.5 MG/DL (ref 0–0.4)
BILIRUB SERPL-MCNC: 0.6 MG/DL (ref 0.2–1.3)
BUN SERPL-MCNC: 13 MG/DL (ref 7–20)
BUN SERPL-MCNC: 15 MG/DL (ref 7–20)
BUN SERPL-MCNC: 16 MG/DL (ref 7–20)
CALCIUM: 10.3 MG/DL (ref 8.4–10.2)
CALCIUM: 10.9 MG/DL (ref 8.4–10.2)
CALCIUM: 9.6 MG/DL (ref 8.4–10.2)
CHLORIDE SERPL-SCNC: 101 MMOL/L (ref 98–107)
CHLORIDE SERPL-SCNC: 97 MMOL/L (ref 98–107)
CHLORIDE SERPL-SCNC: 99 MMOL/L (ref 98–107)
CO2 SERPL-SCNC: 18 MMOL/L (ref 22–30)
CO2 SERPL-SCNC: 20 MMOL/L (ref 22–30)
CO2 SERPL-SCNC: 21 MMOL/L (ref 22–30)
EOSINOPHIL # BLD AUTO: 0 10^3/UL (ref 0–0.6)
EOSINOPHIL NFR BLD AUTO: 0 % (ref 0–6)
ERYTHROCYTE [DISTWIDTH] IN BLOOD BY AUTOMATED COUNT: 13.3 % (ref 11.5–14)
ERYTHROCYTE [DISTWIDTH] IN BLOOD BY AUTOMATED COUNT: 13.4 % (ref 11.5–14)
GLUCOSE SERPL-MCNC: 121 MG/DL (ref 75–110)
GLUCOSE SERPL-MCNC: 147 MG/DL (ref 75–110)
GLUCOSE SERPL-MCNC: 257 MG/DL (ref 75–110)
HCT VFR BLD CALC: 42.1 % (ref 37.9–51)
HCT VFR BLD CALC: 45.1 % (ref 37.9–51)
HGB BLD-MCNC: 14.1 G/DL (ref 13.5–17)
HGB BLD-MCNC: 15.2 G/DL (ref 13.5–17)
INR PPP: 0.89
LIPASE SERPL-CCNC: 25.8 U/L (ref 23–300)
LYMPHOCYTES # BLD AUTO: 0.9 10^3/UL (ref 0.5–4.7)
LYMPHOCYTES NFR BLD AUTO: 5.3 % (ref 13–45)
MAGNESIUM SERPL-MCNC: 1.8 MG/DL (ref 1.6–2.3)
MCH RBC QN AUTO: 30.8 PG (ref 27–33.4)
MCH RBC QN AUTO: 31.2 PG (ref 27–33.4)
MCHC RBC AUTO-ENTMCNC: 33.5 G/DL (ref 32–36)
MCHC RBC AUTO-ENTMCNC: 33.7 G/DL (ref 32–36)
MCV RBC AUTO: 92 FL (ref 80–97)
MCV RBC AUTO: 93 FL (ref 80–97)
METHADONE UR QL SCN: NEGATIVE
MONOCYTES # BLD AUTO: 0.9 10^3/UL (ref 0.1–1.4)
MONOCYTES NFR BLD AUTO: 5.6 % (ref 3–13)
NEUTROPHILS # BLD AUTO: 14.9 10^3/UL (ref 1.7–8.2)
NEUTS SEG NFR BLD AUTO: 88.8 % (ref 42–78)
PCP UR QL SCN: NEGATIVE
PLATELET # BLD: 221 10^3/UL (ref 150–450)
PLATELET # BLD: 222 10^3/UL (ref 150–450)
POTASSIUM SERPL-SCNC: 4.5 MMOL/L (ref 3.6–5)
POTASSIUM SERPL-SCNC: 4.6 MMOL/L (ref 3.6–5)
POTASSIUM SERPL-SCNC: 4.7 MMOL/L (ref 3.6–5)
PROT SERPL-MCNC: 9.6 G/DL (ref 6.3–8.2)
PROTHROMBIN TIME: 12.7 SEC (ref 11.4–15.4)
RBC # BLD AUTO: 4.57 10^6/UL (ref 4.35–5.55)
RBC # BLD AUTO: 4.87 10^6/UL (ref 4.35–5.55)
SALICYLATES SERPL-MCNC: < 1 MG/DL (ref 2–20)
SODIUM SERPL-SCNC: 140.5 MMOL/L (ref 137–145)
SODIUM SERPL-SCNC: 140.6 MMOL/L (ref 137–145)
SODIUM SERPL-SCNC: 145.4 MMOL/L (ref 137–145)
TOTAL CELLS COUNTED % (AUTO): 100 %
URINE AMPHETAMINES SCREEN: NEGATIVE
URINE BENZODIAZEPINES SCREEN: NEGATIVE
URINE COCAINE SCREEN: NEGATIVE
URINE MARIJUANA (THC) SCREEN: (no result)
WBC # BLD AUTO: 16.4 10^3/UL (ref 4–10.5)
WBC # BLD AUTO: 16.7 10^3/UL (ref 4–10.5)

## 2017-12-29 PROCEDURE — 85027 COMPLETE CBC AUTOMATED: CPT

## 2017-12-29 PROCEDURE — 80048 BASIC METABOLIC PNL TOTAL CA: CPT

## 2017-12-29 PROCEDURE — 36415 COLL VENOUS BLD VENIPUNCTURE: CPT

## 2017-12-29 PROCEDURE — 74220 X-RAY XM ESOPHAGUS 1CNTRST: CPT

## 2017-12-29 PROCEDURE — 3E0234Z INTRODUCTION OF SERUM, TOXOID AND VACCINE INTO MUSCLE, PERCUTANEOUS APPROACH: ICD-10-PCS | Performed by: INTERNAL MEDICINE

## 2017-12-29 PROCEDURE — 83036 HEMOGLOBIN GLYCOSYLATED A1C: CPT

## 2017-12-29 PROCEDURE — 99291 CRITICAL CARE FIRST HOUR: CPT

## 2017-12-29 PROCEDURE — 93005 ELECTROCARDIOGRAM TRACING: CPT

## 2017-12-29 PROCEDURE — 82010 KETONE BODYS QUAN: CPT

## 2017-12-29 PROCEDURE — 85610 PROTHROMBIN TIME: CPT

## 2017-12-29 PROCEDURE — 93010 ELECTROCARDIOGRAM REPORT: CPT

## 2017-12-29 PROCEDURE — 83930 ASSAY OF BLOOD OSMOLALITY: CPT

## 2017-12-29 PROCEDURE — 85025 COMPLETE CBC W/AUTO DIFF WBC: CPT

## 2017-12-29 PROCEDURE — 74177 CT ABD & PELVIS W/CONTRAST: CPT

## 2017-12-29 PROCEDURE — 83735 ASSAY OF MAGNESIUM: CPT

## 2017-12-29 PROCEDURE — 81001 URINALYSIS AUTO W/SCOPE: CPT

## 2017-12-29 PROCEDURE — S0164 INJECTION PANTROPRAZOLE: HCPCS

## 2017-12-29 PROCEDURE — 82962 GLUCOSE BLOOD TEST: CPT

## 2017-12-29 PROCEDURE — 96375 TX/PRO/DX INJ NEW DRUG ADDON: CPT

## 2017-12-29 PROCEDURE — 83690 ASSAY OF LIPASE: CPT

## 2017-12-29 PROCEDURE — 96374 THER/PROPH/DIAG INJ IV PUSH: CPT

## 2017-12-29 PROCEDURE — 80307 DRUG TEST PRSMV CHEM ANLYZR: CPT

## 2017-12-29 PROCEDURE — 80053 COMPREHEN METABOLIC PANEL: CPT

## 2017-12-29 RX ADMIN — PROMETHAZINE HYDROCHLORIDE PRN MG: 25 TABLET ORAL at 22:22

## 2017-12-29 RX ADMIN — SUCRALFATE SCH GM: 1 SUSPENSION ORAL at 17:27

## 2017-12-29 RX ADMIN — METRONIDAZOLE SCH MG: 500 TABLET ORAL at 17:26

## 2017-12-29 RX ADMIN — SODIUM CHLORIDE SCH ML: 9 INJECTION, SOLUTION INTRAVENOUS at 22:23

## 2017-12-29 RX ADMIN — HEPARIN SODIUM SCH UNIT: 5000 INJECTION, SOLUTION INTRAVENOUS; SUBCUTANEOUS at 22:23

## 2017-12-29 RX ADMIN — ACETAMINOPHEN PRN MG: 325 TABLET ORAL at 22:23

## 2017-12-29 RX ADMIN — HEPARIN SODIUM SCH UNIT: 5000 INJECTION, SOLUTION INTRAVENOUS; SUBCUTANEOUS at 14:06

## 2017-12-29 RX ADMIN — PROMETHAZINE HYDROCHLORIDE PRN MG: 25 TABLET ORAL at 13:30

## 2017-12-29 RX ADMIN — SODIUM CHLORIDE SCH ML: 9 INJECTION, SOLUTION INTRAVENOUS at 17:25

## 2017-12-29 RX ADMIN — MAGNESIUM SULFATE IN DEXTROSE SCH ML: 10 INJECTION, SOLUTION INTRAVENOUS at 22:23

## 2017-12-29 NOTE — PDOC H&P
History of Present Illness


Admission Date/PCP: 


  12/29/17 12:13





  





Patient complains of: Abdominal pain and nausea


History of Present Illness: 


PIEDAD POPE is a 32 year old male with a past medical history of gastritis, 

alcohol tobacco and marijuana abuse.  Patient presents with 12 hours of 

epigastric abdominal pain and a single episode of coffee-ground emesis.  He 

presents appearing intoxicated, with leukocytosis and metabolic acidosis.  He 

started on an IV fluid challenge and symptomatic management then referred to 

the hospitalist for admission.  Patient admits to binging on alcohol consuming 

at least 750 mL of Bacardi rum last night and last use of marijuana 24 hours 

ago.  Patient denies other recreational drugs or over-the-counter medications.  

He denies previous DTs or seizure.








Past Medical History


Cardiac Medical History: Reports: Hypertension


Neurological Medical History: 


   Denies: Seizures


Psychiatric Medical History: Reports: Alcohol Dependency, Substance Abuse, 

Tobacco Dependency


   Denies: Depression





Social History


Information Source: Patient, [facility] Records


Smoking Status: Current Every Day Smoker


Frequency of Alcohol Use: Heavy


Hx Recreational Drug Use: Yes


Drugs: Marijuana


Hx Prescription Drug Abuse: No





- Advance Directive


Resuscitation Status: Full Code





Family History


Family History: None


Parental Family History Reviewed: Yes


Children Family History Reviewed: Yes


Sibling(s) Family History Reviewed.: Yes





Medication/Allergy


Home Medications: 








No Home Medications  12/29/17 








Allergies/Adverse Reactions: 


 





No Known Allergies Allergy (Verified 06/01/17 10:45)


 











Review of Systems


Constitutional: ABSENT: chills, fever(s), headache(s), weight gain, weight loss


Eyes: ABSENT: visual disturbances


Ears: ABSENT: hearing changes


Cardiovascular: ABSENT: chest pain, dyspnea on exertion, edema, orthropnea, 

palpitations


Respiratory: ABSENT: cough, hemoptysis


Gastrointestinal: ABSENT: abdominal pain, constipation, diarrhea, hematemesis, 

hematochezia, nausea, vomiting


Genitourinary: ABSENT: dysuria, hematuria


Musculoskeletal: ABSENT: joint swelling


Integumentary: ABSENT: rash, wounds


Neurological: ABSENT: abnormal gait, abnormal speech, confusion, dizziness, 

focal weakness, syncope


Psychiatric: ABSENT: anxiety, depression, homidical ideation, suicidal ideation


Endocrine: ABSENT: cold intolerance, heat intolerance, polydipsia, polyuria


Hematologic/Lymphatic: ABSENT: easy bleeding, easy bruising





Physical Exam


Vital Signs: 


 











Temp Pulse Resp BP Pulse Ox


 


 98.6 F   100   18   141/90 H  100 


 


 12/29/17 15:50  12/29/17 15:50  12/29/17 15:50  12/29/17 15:50  12/29/17 15:50











General appearance: PRESENT: cooperative, disheveled, mild distress


Head exam: PRESENT: atraumatic, normocephalic


Eye exam: PRESENT: conjunctiva pink, EOMI, PERRLA.  ABSENT: scleral icterus


Ear exam: PRESENT: normal external ear exam


Mouth exam: PRESENT: moist, tongue midline


Neck exam: ABSENT: carotid bruit, JVD, lymphadenopathy, thyromegaly


Respiratory exam: PRESENT: clear to auscultation juliette.  ABSENT: rales, rhonchi, 

wheezes


Cardiovascular exam: PRESENT: RRR.  ABSENT: diastolic murmur, rubs, systolic 

murmur


Pulses: PRESENT: normal dorsalis pedis pul


Vascular exam: PRESENT: normal capillary refill


GI/Abdominal exam: PRESENT: hyperactive bowel sounds, soft, tenderness.  ABSENT

: ascites, diminished bowel sounds, distended, firm, guarding, rebound, rigid


Rectal exam: PRESENT: deferred


Extremities exam: PRESENT: full ROM.  ABSENT: calf tenderness, clubbing, pedal 

edema


Neurological exam: PRESENT: alert, altered, awake, oriented to person, oriented 

to place, oriented to time, oriented to situation, CN II-XII grossly intact.  

ABSENT: motor sensory deficit


Psychiatric exam: PRESENT: anxious, normal mood.  ABSENT: homicidal ideation, 

suicidal ideation


Skin exam: PRESENT: dry, intact, warm.  ABSENT: cyanosis, rash





Results


Laboratory Results: 


 





 12/29/17 14:16 





 











  12/29/17 12/29/17 12/29/17





  14:16 14:16 14:16


 


Sodium    140.5


 


Potassium    4.6


 


Chloride    97 L


 


Carbon Dioxide    20 L


 


Anion Gap    24 H


 


BUN    15


 


Creatinine    0.90


 


Est GFR ( Amer)    > 60


 


Est GFR (Non-Af Amer)    > 60


 


Glucose    257 H


 


Serum Osmolality   298 


 


Calcium    10.3 H


 


Magnesium  1.8  











Impressions: 


 





Abdomen/Pelvis CT  12/29/17 10:17


IMPRESSION:


1. MILD BOWEL WALL THICKENING THROUGHOUT THE COLON.  THIS IS MOST LIKELY 

ARTIFACT DUE TO NONDISTENTION.  DIFFUSE PANCOLITIS COULD BE A POSSIBILITY 

ALTHOUGH THERE ARE NO OTHER SIGNIFICANT INFLAMMATORY CHANGES OTHERWISE.


2. NO OTHER SIGNIFICANT OR ACUTE FINDING IN THE ABDOMEN OR PELVIS ON CT SCAN 

WITH IV CONTRAST.


 














Assessment & Plan





- Diagnosis


(1) Alcoholic gastritis


Is this a current diagnosis for this admission?: Yes   


Plan: 


Secondary to alcohol binging.  Telemetry bed serial CBC evaluation of INR and 

IV Protonix with Carafate.  Consider GI consultation








(2) High anion gap metabolic acidosis


Is this a current diagnosis for this admission?: Yes   


Plan: 


Secondary to alcohol abuse, D5 normal saline with supportive measures and 

reevaluation of chemistry








(3) Alcohol abuse


Is this a current diagnosis for this admission?: Yes   


Plan: 


Consider referral for outpatient rehab.  And folate ordered








- Time


Time Spent: 50 to 70 Minutes





- Inpatient Certification


Medical Necessity: Need Close Monitoring Due to Risk of Patient Decompensation

## 2017-12-29 NOTE — ER DOCUMENT REPORT
ED General





- General


Chief Complaint: Abdominal Pain


Stated Complaint: ABDOMINAL PAIN


Time Seen by Provider: 12/29/17 09:18


Notes: 





32-year-old male presents with burning pain in his chest abdomen and back with 

intractable vomiting for 12 hours, severe, similar to prior episodes.  He has 

been here a couple times for this once was diagnosed with colitis.  He does 

smoke marijuana heavily every day.  No fever chills or urinary symptoms.  Has 

not tried any meds before coming in.


TRAVEL OUTSIDE OF THE [place] IN LAST 30 DAYS: No





- Related Data


Allergies/Adverse Reactions: 


 





No Known Allergies Allergy (Verified 06/01/17 10:45)


 











Past Medical History





- Social History


Smoking Status: Current Every Day Smoker


Cigarette use (# per day): Yes - The patient ED visit today was directly 

related to their abuse of tobacco. 


Drug Abuse: Marijuana


Family History: None





- Past Medical History


Cardiac Medical History: Reports: Hx Hypertension


Neurological Medical History: Denies: Hx Seizures


Renal/ Medical History: Denies: Hx Peritoneal Dialysis





- Immunizations


Immunizations up to date: Yes


Hx Diphtheria, Pertussis, Tetanus Vaccination: Yes





Review of Systems





- Review of Systems


Notes: 





REVIEW OF SYSTEMS





GEN: Denies fever, chills, weight loss


ENT: Denies sore throat, nasal discharge, ear pain


EYES: Denies blurry vision, eye pain, discharge


CV: D chest pain, denies palpitations, edema


RESP: Denies cough, shortness of breath, wheezing


GI: D vomiting and abdominal pain a


MSK: Denies joint pain/swelling, edema, 


SKIN: Denies rash, skin lesions


LYMPH: Denies swollen glands/lymph nodes


NEURO: Denies headache, focal weakness or numbness, dizziness


PSYCH: Denies depression, suicidal or homicidal ideation








PHYSICAL EXAMINATION





General: Slightly diaphoretic, ill-appearing


Head: Atraumatic, normocephalic


ENT: Mouth normal, oropharynx moist, no exudates or tonsillar enlargement


Eyes: Conjunctiva normal, pupils equal, lids normal


Neck: No JVD, supple, no guarding


CVS: Normal rate, regular rhythm, no murmurs


Resp: No resp distress, equal and normal breath sounds bilaterally


GI: Nondistended, soft, no tenderness to palpation, no rebound or guarding


Ext: No deformities, no edema, normal range of motion in upper and lower ext


Back: No CVA or midline TTP


Skin: No rash, warm


Lymphatic: No lymphadeopathy noted


Neuro: Awake, alert.  Face symmetric.  GCS 15.





Physical Exam





- Vital signs


Vitals: 


 











Temp Pulse Resp BP Pulse Ox


 


 97.6 F   100   20   128/99 H  100 


 


 12/29/17 08:57  12/29/17 08:57  12/29/17 08:57  12/29/17 08:57  12/29/17 08:57














Course





- Re-evaluation


Re-evalutation: 





12/29/17 09:31


32-year-old male presents with belly pain chest pain back pain and vomiting.  

He has had this before.  He does have a history of colitis but today he has no 

fever or abdominal tenderness.  This is likely a combination of cyclic vomiting

, or cannabis hyperemesis but I will rule out arrhythmia with EKG, will get CMP 

to rule out liver disease and a lipase to rule out pancreatitis.  We will treat 

him empiric with Haldol Ativan


12/29/17 10:11


ECG shows peak T waves.  Labs are pending.  Will get Accu-Chek.  Concern for 

hyperkalemia versus DKA versus both..


12/29/17 10:12





12/29/17 11:33


Potassium returned normal.  Patient does have a decreased bicarb and increased 

anion gap and sugars only 121.  Possibilities include alcoholic ketoacidosis, 

starvation ketoacidosis and euglycemic DKA.  Will give sugar empirically, IV 

fluids continue antiemetics.  Will need to be admitted.  We will still get a CT 

to rule out surgical cause.  Pain is added.


12/29/17 11:4


Reassessed at 11:40 AM: Feeling slightly better but still feels terrible.  

Still nauseous.  CT looks negative.  Patient now admits to drinking heavily 

yesterday.  I think his ketoacidosis is probably due partially alcohol and 

partially due to starvation.  We will continue glucose fluids and admit.


12/29/17 12:00


Added serum osmole's, salicylate and acetaminophen.  Discussed with Grant Zarco for admission.





- Vital Signs


Vital signs: 


 











Temp Pulse Resp BP Pulse Ox


 


 97.6 F   102 H  20   142/74 H  98 


 


 12/29/17 08:57  12/29/17 09:00  12/29/17 09:00  12/29/17 09:00  12/29/17 09:00














- Laboratory


Result Diagrams: 


 12/29/17 10:16





 12/29/17 10:11


Laboratory results interpreted by me: 


 











  12/29/17 12/29/17 12/29/17





  10:11 10:16 10:16


 


WBC   16.7 H 


 


Seg Neutrophils %   88.8 H 


 


Lymphocytes %   5.3 L 


 


Absolute Neutrophils   14.9 H 


 


Sodium  145.4 H  


 


Carbon Dioxide  18 L  


 


Anion Gap  30 H  


 


Glucose  121 H  


 


POC Glucose    124 H


 


Calcium  10.9 H  


 


Direct Bilirubin  0.5 H  


 


AST  153 H  


 


Total Protein  9.6 H  


 


Albumin  5.6 H  














- Diagnostic Test


Radiology reviewed: Image reviewed, Reports reviewed





Critical Care Note





- Critical Care Note


Total time excluding time spent on procedures (mins): 40


Comments: 





The above patient is critically ill.  Not including procedures, but including 

direct re-evaluations, speaking with patient and/or consultants, interpreting 

results, and documenting, I spent the total amount of minute listed listed 

above on critical care time





Discharge





- Discharge


Clinical Impression: 


 High anion gap metabolic acidosis





Intractable vomiting


Qualifiers:


 Vomiting type: unspecified Nausea presence: with nausea Qualified Code(s): 

R11.2 - Nausea with vomiting, unspecified





Condition: Fair


Disposition: ADMITTED AS INPATIENT


Admitting Provider: Hospitalist


Unit Admitted: Telemetry

## 2017-12-29 NOTE — RADIOLOGY REPORT (SQ)
EXAM DESCRIPTION:  CT ABD/PELVIS WITH IV ONLY



COMPLETED DATE/TIME:  12/29/2017 11:28 am



REASON FOR STUDY:  ABD PAIN vomiting



COMPARISON:  None.



TECHNIQUE:  CT scan of the abdomen and pelvis performed using helical scanning technique with dynamic
 intravenous contrast injection.  No oral contrast. Images reviewed with lung, soft tissue, and bone 
windows. Reconstructed coronal and sagittal MPR images reviewed. Delayed images for evaluation of the
 urinary system also acquired. All images stored on PACS.

All CT scanners at this facility use dose modulation, iterative reconstruction, and/or weight based d
osing when appropriate to reduce radiation dose to as low as reasonably achievable (ALARA).

[facility]: Dose Right  [facility]: CareDose    [facility]: Dose Right    [facility]: Teradose 4D    [facility]: Smart Technologies



CONTRAST TYPE AND DOSE:  contrast/concentration: Isovue 370.00 mg/ml; Total Contrast Delivered: 74.1 
ml; Total Saline Delivered: 66.0 ml



RENAL FUNCTION:  None required. The patient is less than 50 years old.



RADIATION DOSE:  CT Rad equipment meets quality standard of care and radiation dose reduction techniq
ues were employed. CTDIvol: 4.8 - 4.9 mGy. DLP: 485 mGy-cm..



LIMITATIONS:  None.



FINDINGS:  LOWER CHEST: No significant findings. No nodules or infiltrates.

LIVER: Normal size. No masses.  No dilated ducts.

SPLEEN: Normal size. No focal lesions.

PANCREAS: No masses. No significant calcifications. No adjacent inflammation or peripancreatic fluid 
collections. Pancreatic duct not dilated.

GALLBLADDER: No identified stones by CT criteria. No inflammatory changes to suggest cholecystitis.

ADRENAL GLANDS: No significant masses or asymmetry.

RIGHT KIDNEY AND URETER: No solid masses.   No significant calcifications.   No hydronephrosis or hyd
roureter.

LEFT KIDNEY AND URETER: No solid masses.   No significant calcifications.   No hydronephrosis or hydr
oureter.

AORTA AND VESSELS: No aneurysm. No dissection. Renal arteries, SMA, celiac without stenosis.

RETROPERITONEUM: No retroperitoneal adenopathy, hemorrhage or masses.

BOWEL AND PERITONEAL CAVITY: There is mild bowel wall thickening throughout the colon, which is not d
istended.  No masses or inflammatory changes. No free fluid or peritoneal masses.

APPENDIX: Normal.

PELVIS: No mass.  No free fluid. Normal bladder.

ABDOMINAL WALL: No masses. No hernias.

BONES: No significant or acute findings.

OTHER: No other significant finding.



IMPRESSION:

1. MILD BOWEL WALL THICKENING THROUGHOUT THE COLON.  THIS IS MOST LIKELY ARTIFACT DUE TO NONDISTENTIO
N.  DIFFUSE PANCOLITIS COULD BE A POSSIBILITY ALTHOUGH THERE ARE NO OTHER SIGNIFICANT INFLAMMATORY CH
ANGES OTHERWISE.

2. NO OTHER SIGNIFICANT OR ACUTE FINDING IN THE ABDOMEN OR PELVIS ON CT SCAN WITH IV CONTRAST.



TECHNICAL DOCUMENTATION:  JOB ID:  [id number]

Quality ID # [id number]: Final reports with documentation of one or more dose reduction techniques (e.g., Au
tomated exposure control, adjustment of the mA and/or kV according to patient size, use of iterative 
reconstruction technique)

 2011 [employer]- All Rights Reserved

## 2017-12-30 LAB
ADD MANUAL DIFF: NO
ANION GAP SERPL CALC-SCNC: 11 MMOL/L (ref 5–19)
ANION GAP SERPL CALC-SCNC: 13 MMOL/L (ref 5–19)
APPEARANCE UR: CLEAR
APTT PPP: YELLOW S
BASOPHILS # BLD AUTO: 0 10^3/UL (ref 0–0.2)
BASOPHILS NFR BLD AUTO: 0.3 % (ref 0–2)
BILIRUB UR QL STRIP: NEGATIVE
BUN SERPL-MCNC: 11 MG/DL (ref 7–20)
BUN SERPL-MCNC: 9 MG/DL (ref 7–20)
CALCIUM: 8.8 MG/DL (ref 8.4–10.2)
CALCIUM: 9.1 MG/DL (ref 8.4–10.2)
CHLORIDE SERPL-SCNC: 103 MMOL/L (ref 98–107)
CHLORIDE SERPL-SCNC: 104 MMOL/L (ref 98–107)
CO2 SERPL-SCNC: 26 MMOL/L (ref 22–30)
CO2 SERPL-SCNC: 26 MMOL/L (ref 22–30)
EOSINOPHIL # BLD AUTO: 0 10^3/UL (ref 0–0.6)
EOSINOPHIL NFR BLD AUTO: 0 % (ref 0–6)
ERYTHROCYTE [DISTWIDTH] IN BLOOD BY AUTOMATED COUNT: 13.6 % (ref 11.5–14)
GLUCOSE SERPL-MCNC: 114 MG/DL (ref 75–110)
GLUCOSE SERPL-MCNC: 94 MG/DL (ref 75–110)
GLUCOSE UR STRIP-MCNC: NEGATIVE MG/DL
HCT VFR BLD CALC: 38.4 % (ref 37.9–51)
HGB BLD-MCNC: 12.8 G/DL (ref 13.5–17)
KETONES UR STRIP-MCNC: 80 MG/DL
LYMPHOCYTES # BLD AUTO: 1.6 10^3/UL (ref 0.5–4.7)
LYMPHOCYTES NFR BLD AUTO: 12.5 % (ref 13–45)
MCH RBC QN AUTO: 30.8 PG (ref 27–33.4)
MCHC RBC AUTO-ENTMCNC: 33.5 G/DL (ref 32–36)
MCV RBC AUTO: 92 FL (ref 80–97)
MONOCYTES # BLD AUTO: 1 10^3/UL (ref 0.1–1.4)
MONOCYTES NFR BLD AUTO: 8.4 % (ref 3–13)
NEUTROPHILS # BLD AUTO: 9.8 10^3/UL (ref 1.7–8.2)
NEUTS SEG NFR BLD AUTO: 78.8 % (ref 42–78)
NITRITE UR QL STRIP: NEGATIVE
PH UR STRIP: 5 [PH] (ref 5–9)
PLATELET # BLD: 204 10^3/UL (ref 150–450)
POTASSIUM SERPL-SCNC: 3.9 MMOL/L (ref 3.6–5)
POTASSIUM SERPL-SCNC: 4.5 MMOL/L (ref 3.6–5)
PROT UR STRIP-MCNC: 30 MG/DL
RBC # BLD AUTO: 4.17 10^6/UL (ref 4.35–5.55)
SODIUM SERPL-SCNC: 140.1 MMOL/L (ref 137–145)
SODIUM SERPL-SCNC: 142.6 MMOL/L (ref 137–145)
SP GR UR STRIP: 1.02
TOTAL CELLS COUNTED % (AUTO): 100 %
UROBILINOGEN UR-MCNC: NEGATIVE MG/DL (ref ?–2)
WBC # BLD AUTO: 12.4 10^3/UL (ref 4–10.5)

## 2017-12-30 RX ADMIN — SUCRALFATE SCH GM: 1 SUSPENSION ORAL at 05:44

## 2017-12-30 RX ADMIN — LORAZEPAM PRN MG: 2 INJECTION INTRAMUSCULAR; INTRAVENOUS at 10:19

## 2017-12-30 RX ADMIN — PROMETHAZINE HYDROCHLORIDE PRN MG: 25 INJECTION INTRAMUSCULAR; INTRAVENOUS at 14:56

## 2017-12-30 RX ADMIN — SODIUM CHLORIDE PRN ML: 9 INJECTION, SOLUTION INTRAVENOUS at 10:20

## 2017-12-30 RX ADMIN — SODIUM CHLORIDE SCH ML: 9 INJECTION, SOLUTION INTRAVENOUS at 21:35

## 2017-12-30 RX ADMIN — SUCRALFATE SCH GM: 1 SUSPENSION ORAL at 01:35

## 2017-12-30 RX ADMIN — HEPARIN SODIUM SCH UNIT: 5000 INJECTION, SOLUTION INTRAVENOUS; SUBCUTANEOUS at 21:35

## 2017-12-30 RX ADMIN — DEXAMETHASONE SODIUM PHOSPHATE PRN MG: 10 INJECTION INTRAMUSCULAR; INTRAVENOUS at 14:33

## 2017-12-30 RX ADMIN — Medication SCH MG: at 10:16

## 2017-12-30 RX ADMIN — MULTIVITAMIN TABLET SCH TAB: TABLET at 10:16

## 2017-12-30 RX ADMIN — MAGNESIUM SULFATE IN DEXTROSE SCH ML: 10 INJECTION, SOLUTION INTRAVENOUS at 01:36

## 2017-12-30 RX ADMIN — METRONIDAZOLE SCH MG: 500 TABLET ORAL at 05:44

## 2017-12-30 RX ADMIN — METRONIDAZOLE SCH MG: 500 TABLET ORAL at 01:36

## 2017-12-30 RX ADMIN — HEPARIN SODIUM SCH UNIT: 5000 INJECTION, SOLUTION INTRAVENOUS; SUBCUTANEOUS at 13:03

## 2017-12-30 RX ADMIN — PROMETHAZINE HYDROCHLORIDE PRN MG: 25 TABLET ORAL at 13:09

## 2017-12-30 RX ADMIN — HEPARIN SODIUM SCH UNIT: 5000 INJECTION, SOLUTION INTRAVENOUS; SUBCUTANEOUS at 05:44

## 2017-12-30 RX ADMIN — PANTOPRAZOLE SODIUM SCH MG: 40 INJECTION, POWDER, FOR SOLUTION INTRAVENOUS at 17:58

## 2017-12-30 RX ADMIN — LORAZEPAM PRN MG: 2 INJECTION INTRAMUSCULAR; INTRAVENOUS at 15:31

## 2017-12-30 RX ADMIN — MORPHINE SULFATE PRN MG: 10 INJECTION INTRAMUSCULAR; INTRAVENOUS; SUBCUTANEOUS at 16:38

## 2017-12-30 RX ADMIN — ACETAMINOPHEN PRN MG: 325 TABLET ORAL at 13:03

## 2017-12-30 NOTE — PDOC PROGRESS REPORT
Subjective


Progress Note for:: 12/30/17


Subjective:: 





Patient reports that he feels better.  I am not been able to eat because does 

not like what came in to tray.  By the time of dictating this note the nurse 

reports that patient started vomiting and complains of back pain


Reason For Visit: 


ABD PAIN,METABOLIC ACIDOSIS,COLITIS,ETOH DEP








Physical Exam


Vital Signs: 


 











Temp Pulse Resp BP Pulse Ox


 


 98.6 F   82   16   124/78   99 


 


 12/30/17 00:20  12/30/17 07:00  12/30/17 00:20  12/30/17 00:20  12/30/17 00:20








 Intake & Output











 12/29/17 12/30/17 12/31/17





 06:59 06:59 06:59


 


Intake Total  2090 


 


Output Total  480 


 


Balance  1610 


 


Weight  68.5 kg 











General appearance: PRESENT: no acute distress, cooperative, well-developed, 

well-nourished


Head exam: PRESENT: atraumatic, normocephalic


Eye exam: PRESENT: EOMI, PERRLA


Ear exam: PRESENT: normal external ear exam, TM's normal bilaterally


Mouth exam: PRESENT: moist, neck supple


Neck exam: PRESENT: full ROM.  ABSENT: JVD


Respiratory exam: PRESENT: clear to auscultation juliette, unlabored


Cardiovascular exam: PRESENT: RRR.  ABSENT: diastolic murmur, systolic murmur


Vascular exam: PRESENT: normal capillary refill


GI/Abdominal exam: PRESENT: normal bowel sounds, soft.  ABSENT: tenderness


Extremities exam: PRESENT: full ROM


Neurological exam: PRESENT: alert, awake, oriented to person, oriented to place

, oriented to time


Psychiatric exam: PRESENT: appropriate affect





Results


Laboratory Results: 


 





 12/30/17 07:13 





 











  12/29/17 12/29/17 12/29/17





  13:20 14:16 14:16


 


WBC   


 


RBC   


 


Hgb   


 


Hct   


 


MCV   


 


MCH   


 


MCHC   


 


RDW   


 


Plt Count   


 


Seg Neutrophils %   


 


Lymphocytes %   


 


Monocytes %   


 


Eosinophils %   


 


Basophils %   


 


Absolute Neutrophils   


 


Absolute Lymphocytes   


 


Absolute Monocytes   


 


Absolute Eosinophils   


 


Absolute Basophils   


 


Sodium   


 


Potassium   


 


Chloride   


 


Carbon Dioxide   


 


Anion Gap   


 


BUN   


 


Creatinine   


 


Est GFR ( Amer)   


 


Est GFR (Non-Af Amer)   


 


Glucose   


 


Serum Osmolality    298


 


Calcium   


 


Magnesium   1.8 


 


Urine Color  YELLOW  


 


Urine Appearance  CLEAR  


 


Urine pH  5.0  


 


Ur Specific Gravity  1.018  


 


Urine Protein  30 H  


 


Urine Glucose (UA)  NEGATIVE  


 


Urine Ketones  80 H  


 


Urine Blood  NEGATIVE  


 


Urine Nitrite  NEGATIVE  


 


Ur Leukocyte Esterase  NEGATIVE  


 


Urine WBC (Auto)  1  


 


Urine RBC (Auto)  1  














  12/29/17 12/29/17 12/29/17





  14:16 18:19 18:19


 


WBC    16.4 H


 


RBC    4.57


 


Hgb    14.1


 


Hct    42.1


 


MCV    92


 


MCH    30.8


 


MCHC    33.5


 


RDW    13.3


 


Plt Count    222


 


Seg Neutrophils %   


 


Lymphocytes %   


 


Monocytes %   


 


Eosinophils %   


 


Basophils %   


 


Absolute Neutrophils   


 


Absolute Lymphocytes   


 


Absolute Monocytes   


 


Absolute Eosinophils   


 


Absolute Basophils   


 


Sodium  140.5  140.6 


 


Potassium  4.6  4.7 


 


Chloride  97 L  101 


 


Carbon Dioxide  20 L  21 L 


 


Anion Gap  24 H  19 


 


BUN  15  13 


 


Creatinine  0.90  0.83 


 


Est GFR ( Amer)  > 60  > 60 


 


Est GFR (Non-Af Amer)  > 60  > 60 


 


Glucose  257 H  147 H 


 


Serum Osmolality   


 


Calcium  10.3 H  9.6 


 


Magnesium   


 


Urine Color   


 


Urine Appearance   


 


Urine pH   


 


Ur Specific Gravity   


 


Urine Protein   


 


Urine Glucose (UA)   


 


Urine Ketones   


 


Urine Blood   


 


Urine Nitrite   


 


Ur Leukocyte Esterase   


 


Urine WBC (Auto)   


 


Urine RBC (Auto)   














  12/30/17 12/30/17





  00:18 07:13


 


WBC   12.4 H


 


RBC   4.17 L


 


Hgb   12.8 L


 


Hct   38.4


 


MCV   92


 


MCH   30.8


 


MCHC   33.5


 


RDW   13.6


 


Plt Count   204


 


Seg Neutrophils %   78.8 H


 


Lymphocytes %   12.5 L


 


Monocytes %   8.4


 


Eosinophils %   0.0


 


Basophils %   0.3


 


Absolute Neutrophils   9.8 H


 


Absolute Lymphocytes   1.6


 


Absolute Monocytes   1.0


 


Absolute Eosinophils   0.0


 


Absolute Basophils   0.0


 


Sodium  142.6 


 


Potassium  4.5 


 


Chloride  104 


 


Carbon Dioxide  26 


 


Anion Gap  13 


 


BUN  11 


 


Creatinine  0.91 


 


Est GFR ( Amer)  > 60 


 


Est GFR (Non-Af Amer)  > 60 


 


Glucose  114 H 


 


Serum Osmolality  


 


Calcium  9.1 


 


Magnesium  


 


Urine Color  


 


Urine Appearance  


 


Urine pH  


 


Ur Specific Gravity  


 


Urine Protein  


 


Urine Glucose (UA)  


 


Urine Ketones  


 


Urine Blood  


 


Urine Nitrite  


 


Ur Leukocyte Esterase  


 


Urine WBC (Auto)  


 


Urine RBC (Auto)  











Impressions: 


 





Abdomen/Pelvis CT  12/29/17 10:17


IMPRESSION:


1. MILD BOWEL WALL THICKENING THROUGHOUT THE COLON.  THIS IS MOST LIKELY 

ARTIFACT DUE TO NONDISTENTION.  DIFFUSE PANCOLITIS COULD BE A POSSIBILITY 

ALTHOUGH THERE ARE NO OTHER SIGNIFICANT INFLAMMATORY CHANGES OTHERWISE.


2. NO OTHER SIGNIFICANT OR ACUTE FINDING IN THE ABDOMEN OR PELVIS ON CT SCAN 

WITH IV CONTRAST.


 














Assessment & Plan





- Diagnosis


(1) Alcoholic gastritis


Qualifiers: 


   Chronicity: acute   Gastritis bleeding: without bleeding   Qualified Code(s)

: K29.20 - Alcoholic gastritis without bleeding   


Is this a current diagnosis for this admission?: Yes   


Plan: 


Order lipase and place patient on Protonix IV








(2) Alcohol abuse


Is this a current diagnosis for this admission?: Yes   


Plan: 


Patient encouraged to quit. he may also need counseling as outpatient.








(3) Colitis


Is this a current diagnosis for this admission?: Yes   


Plan: 


Clinically improved.








- Time


Time Spent with patient: 15-24 minutes


Medications reviewed and adjusted accordingly: Yes


Anticipated discharge: Home


Within: within 48 hours





- Inpatient Certification


Based on my medical assessment, after consideration of the patient's 

comorbidities, presenting symptoms, or acuity I expect that the services needed 

warrant INPATIENT care.: Yes


I certify that my determination is in accordance with my understanding of 

Medicare's requirements for reasonable and necessary INPATIENT services [42 CFR 

412.3e].: Yes


Medical Necessity: Need For IV Fluids, Need for Pain Control

## 2017-12-31 LAB
ADD MANUAL DIFF: NO
ANION GAP SERPL CALC-SCNC: 12 MMOL/L (ref 5–19)
BASOPHILS # BLD AUTO: 0.1 10^3/UL (ref 0–0.2)
BASOPHILS NFR BLD AUTO: 0.7 % (ref 0–2)
BUN SERPL-MCNC: 7 MG/DL (ref 7–20)
CALCIUM: 8.7 MG/DL (ref 8.4–10.2)
CHLORIDE SERPL-SCNC: 105 MMOL/L (ref 98–107)
CO2 SERPL-SCNC: 24 MMOL/L (ref 22–30)
EOSINOPHIL # BLD AUTO: 0 10^3/UL (ref 0–0.6)
EOSINOPHIL NFR BLD AUTO: 0.1 % (ref 0–6)
ERYTHROCYTE [DISTWIDTH] IN BLOOD BY AUTOMATED COUNT: 13.5 % (ref 11.5–14)
GLUCOSE SERPL-MCNC: 82 MG/DL (ref 75–110)
HCT VFR BLD CALC: 36.8 % (ref 37.9–51)
HGB BLD-MCNC: 12.2 G/DL (ref 13.5–17)
LIPASE SERPL-CCNC: 96.1 U/L (ref 23–300)
LYMPHOCYTES # BLD AUTO: 2.1 10^3/UL (ref 0.5–4.7)
LYMPHOCYTES NFR BLD AUTO: 23.3 % (ref 13–45)
MAGNESIUM SERPL-MCNC: 2.3 MG/DL (ref 1.6–2.3)
MCH RBC QN AUTO: 31.2 PG (ref 27–33.4)
MCHC RBC AUTO-ENTMCNC: 33.2 G/DL (ref 32–36)
MCV RBC AUTO: 94 FL (ref 80–97)
MONOCYTES # BLD AUTO: 0.8 10^3/UL (ref 0.1–1.4)
MONOCYTES NFR BLD AUTO: 8.7 % (ref 3–13)
NEUTROPHILS # BLD AUTO: 5.9 10^3/UL (ref 1.7–8.2)
NEUTS SEG NFR BLD AUTO: 67.2 % (ref 42–78)
PLATELET # BLD: 188 10^3/UL (ref 150–450)
POTASSIUM SERPL-SCNC: 4.1 MMOL/L (ref 3.6–5)
RBC # BLD AUTO: 3.92 10^6/UL (ref 4.35–5.55)
SODIUM SERPL-SCNC: 140.9 MMOL/L (ref 137–145)
TOTAL CELLS COUNTED % (AUTO): 100 %
WBC # BLD AUTO: 8.8 10^3/UL (ref 4–10.5)

## 2017-12-31 RX ADMIN — DEXAMETHASONE SODIUM PHOSPHATE PRN MG: 10 INJECTION INTRAMUSCULAR; INTRAVENOUS at 17:20

## 2017-12-31 RX ADMIN — OLANZAPINE SCH MG: 10 INJECTION, POWDER, FOR SOLUTION INTRAMUSCULAR at 21:57

## 2017-12-31 RX ADMIN — DEXTROSE, SODIUM CHLORIDE, AND POTASSIUM CHLORIDE PRN ML: 5; .45; .15 INJECTION INTRAVENOUS at 16:43

## 2017-12-31 RX ADMIN — HEPARIN SODIUM SCH UNIT: 5000 INJECTION, SOLUTION INTRAVENOUS; SUBCUTANEOUS at 21:58

## 2017-12-31 RX ADMIN — HEPARIN SODIUM SCH UNIT: 5000 INJECTION, SOLUTION INTRAVENOUS; SUBCUTANEOUS at 15:50

## 2017-12-31 RX ADMIN — DEXAMETHASONE SODIUM PHOSPHATE PRN MG: 10 INJECTION INTRAMUSCULAR; INTRAVENOUS at 00:24

## 2017-12-31 RX ADMIN — PROMETHAZINE HYDROCHLORIDE PRN MG: 25 INJECTION INTRAMUSCULAR; INTRAVENOUS at 00:24

## 2017-12-31 RX ADMIN — LORAZEPAM PRN MG: 2 INJECTION INTRAMUSCULAR; INTRAVENOUS at 15:50

## 2017-12-31 RX ADMIN — LORAZEPAM PRN MG: 2 INJECTION INTRAMUSCULAR; INTRAVENOUS at 21:57

## 2017-12-31 RX ADMIN — Medication SCH MG: at 09:44

## 2017-12-31 RX ADMIN — MORPHINE SULFATE PRN MG: 10 INJECTION INTRAMUSCULAR; INTRAVENOUS; SUBCUTANEOUS at 12:28

## 2017-12-31 RX ADMIN — MULTIVITAMIN TABLET SCH TAB: TABLET at 09:44

## 2017-12-31 RX ADMIN — PANTOPRAZOLE SODIUM SCH MG: 40 INJECTION, POWDER, FOR SOLUTION INTRAVENOUS at 06:18

## 2017-12-31 RX ADMIN — HEPARIN SODIUM SCH UNIT: 5000 INJECTION, SOLUTION INTRAVENOUS; SUBCUTANEOUS at 06:18

## 2017-12-31 RX ADMIN — SODIUM CHLORIDE SCH ML: 9 INJECTION, SOLUTION INTRAVENOUS at 00:25

## 2017-12-31 RX ADMIN — DEXAMETHASONE SODIUM PHOSPHATE PRN MG: 10 INJECTION INTRAMUSCULAR; INTRAVENOUS at 10:29

## 2017-12-31 RX ADMIN — SODIUM CHLORIDE PRN ML: 9 INJECTION, SOLUTION INTRAVENOUS at 08:02

## 2017-12-31 RX ADMIN — PANTOPRAZOLE SODIUM SCH MG: 40 INJECTION, POWDER, FOR SOLUTION INTRAVENOUS at 21:57

## 2017-12-31 RX ADMIN — LORAZEPAM PRN MG: 2 INJECTION INTRAMUSCULAR; INTRAVENOUS at 12:05

## 2017-12-31 NOTE — PDOC PROGRESS REPORT
Subjective


Progress Note for:: 12/31/17


Subjective:: 





Patient reports that he is afraid of eating after the incident of vomiting when 

tried eating strawberries yesterday.  Patient also complaining of pain and had 

been refusing oral medications.  Nurse reported vomiting and that it appears 

that he had experienced similar episodes in the past when he starts vomiting.


Reason For Visit: 


ABD PAIN,METABOLIC ACIDOSIS,COLITIS,ETOH DEP








Physical Exam


Vital Signs: 


 











Temp Pulse Resp BP Pulse Ox


 


 98.5 F   87   16   130/85 H  99 


 


 12/31/17 03:48  12/31/17 03:48  12/31/17 03:48  12/31/17 03:48  12/31/17 03:48








 Intake & Output











 12/30/17 12/31/17 01/01/18





 06:59 06:59 06:59


 


Intake Total 2090 3540 


 


Output Total 480 1125 


 


Balance 1610 2415 


 


Weight 68.5 kg 68.5 kg 











General appearance: PRESENT: no acute distress, cooperative


Head exam: PRESENT: atraumatic, normocephalic


Eye exam: PRESENT: EOMI, PERRLA


Ear exam: PRESENT: normal external ear exam


Mouth exam: PRESENT: moist


Neck exam: PRESENT: full ROM, tenderness.  ABSENT: JVD


Respiratory exam: PRESENT: clear to auscultation juliette


Cardiovascular exam: PRESENT: RRR.  ABSENT: diastolic murmur, systolic murmur


Vascular exam: PRESENT: normal capillary refill


GI/Abdominal exam: PRESENT: normal bowel sounds, soft.  ABSENT: tenderness


Extremities exam: PRESENT: full ROM.  ABSENT: joint swelling, pedal edema


Neurological exam: PRESENT: alert, awake, oriented to person, oriented to place

, oriented to time


Psychiatric exam: PRESENT: anxious


Skin exam: PRESENT: normal color





Results


Laboratory Results: 


 











  12/30/17 12/30/17 12/30/17





  07:13 07:13 07:13


 


WBC  12.4 H  


 


RBC  4.17 L  


 


Hgb  12.8 L  


 


Hct  38.4  


 


MCV  92  


 


MCH  30.8  


 


MCHC  33.5  


 


RDW  13.6  


 


Plt Count  204  


 


Seg Neutrophils %  78.8 H  


 


Lymphocytes %  12.5 L  


 


Monocytes %  8.4  


 


Eosinophils %  0.0  


 


Basophils %  0.3  


 


Absolute Neutrophils  9.8 H  


 


Absolute Lymphocytes  1.6  


 


Absolute Monocytes  1.0  


 


Absolute Eosinophils  0.0  


 


Absolute Basophils  0.0  


 


Sodium   140.1 


 


Potassium   3.9 


 


Chloride   103 


 


Carbon Dioxide   26 


 


Anion Gap   11 


 


BUN   9 


 


Creatinine   0.80 


 


Est GFR ( Amer)   > 60 


 


Est GFR (Non-Af Amer)   > 60 


 


Glucose   94 


 


Calcium   8.8 


 


Lipase    55.7











Impressions: 


 





Abdomen/Pelvis CT  12/29/17 10:17


IMPRESSION:


1. MILD BOWEL WALL THICKENING THROUGHOUT THE COLON.  THIS IS MOST LIKELY 

ARTIFACT DUE TO NONDISTENTION.  DIFFUSE PANCOLITIS COULD BE A POSSIBILITY 

ALTHOUGH THERE ARE NO OTHER SIGNIFICANT INFLAMMATORY CHANGES OTHERWISE.


2. NO OTHER SIGNIFICANT OR ACUTE FINDING IN THE ABDOMEN OR PELVIS ON CT SCAN 

WITH IV CONTRAST.


 














Assessment & Plan





- Diagnosis


(1) Alcoholic gastritis


Qualifiers: 


   Chronicity: acute   Gastritis bleeding: without bleeding   Qualified Code(s)

: K29.20 - Alcoholic gastritis without bleeding   


Is this a current diagnosis for this admission?: Yes   


Plan: 


Discontinue Protonix IV and place on oral.  








(2) Alcohol abuse


Is this a current diagnosis for this admission?: Yes   


Plan: 


Patient encouraged to quit. He may also need counseling as outpatient.








(3) Colitis


Is this a current diagnosis for this admission?: Yes   


Plan: 


Clinically improved.











(5) Vomiting


Qualifiers: 


   Vomiting Intractability: unspecified   Nausea presence: without nausea 


Is this a current diagnosis for this admission?: Yes   


Plan: 


Suspect the patient might be having cyclical vomiting.  Will order a barium 

swallow. Will treat with Benadryl IV and Zyprexa IM.








- Time


Time Spent with patient: 15-24 minutes


Medications reviewed and adjusted accordingly: Yes


Anticipated discharge: Home


Within: within 48 hours





- Inpatient Certification


Based on my medical assessment, after consideration of the patient's 

comorbidities, presenting symptoms, or acuity I expect that the services needed 

warrant INPATIENT care.: Yes


I certify that my determination is in accordance with my understanding of 

Medicare's requirements for reasonable and necessary INPATIENT services [42 CFR 

412.3e].: Yes


Medical Necessity: Need For IV Fluids, Other - Antiemetics

## 2017-12-31 NOTE — EKG REPORT
SEVERITY:- ABNORMAL ECG -

SINUS RHYTHM

ST ELEVATION SUGGESTS PERICARDITIS

:

Confirmed by: Myrna Mora MD 31-Dec-2017 12:33:25

## 2018-01-01 RX ADMIN — HEPARIN SODIUM SCH UNIT: 5000 INJECTION, SOLUTION INTRAVENOUS; SUBCUTANEOUS at 14:52

## 2018-01-01 RX ADMIN — HEPARIN SODIUM SCH UNIT: 5000 INJECTION, SOLUTION INTRAVENOUS; SUBCUTANEOUS at 07:45

## 2018-01-01 RX ADMIN — PANTOPRAZOLE SODIUM SCH MG: 40 INJECTION, POWDER, FOR SOLUTION INTRAVENOUS at 22:13

## 2018-01-01 RX ADMIN — DEXTROSE, SODIUM CHLORIDE, AND POTASSIUM CHLORIDE PRN ML: 5; .45; .15 INJECTION INTRAVENOUS at 10:13

## 2018-01-01 RX ADMIN — PANTOPRAZOLE SODIUM SCH MG: 40 INJECTION, POWDER, FOR SOLUTION INTRAVENOUS at 09:42

## 2018-01-01 RX ADMIN — DEXTROSE, SODIUM CHLORIDE, AND POTASSIUM CHLORIDE PRN ML: 5; .45; .15 INJECTION INTRAVENOUS at 22:13

## 2018-01-01 RX ADMIN — OLANZAPINE SCH MG: 10 INJECTION, POWDER, FOR SOLUTION INTRAMUSCULAR at 22:13

## 2018-01-01 RX ADMIN — DEXTROSE, SODIUM CHLORIDE, AND POTASSIUM CHLORIDE PRN ML: 5; .45; .15 INJECTION INTRAVENOUS at 02:00

## 2018-01-01 RX ADMIN — HEPARIN SODIUM SCH UNIT: 5000 INJECTION, SOLUTION INTRAVENOUS; SUBCUTANEOUS at 22:13

## 2018-01-01 NOTE — PDOC PROGRESS REPORT
Subjective


Progress Note for:: 01/01/18


Subjective:: 





Patient was covered with his blanket up to the top of his head as usual.  When 

inquired how he was doing he became very upset and started cursing.  He wanted 

a different physician because he was being forced to eat.  According to nursing 

report patient care complaining that people would go into his room and keep 

wanting for him to eat but he was nauseated.  Interestingly nobody had been 

into his room requesting him to eat.


Reason For Visit: 


ABD PAIN,METABOLIC ACIDOSIS,COLITIS,ETOH DEP








Physical Exam


Vital Signs: 


 











Temp Pulse Resp BP Pulse Ox


 


 98.3 F   89   16   138/76 H  96 


 


 01/01/18 04:00  01/01/18 00:00  01/01/18 00:00  01/01/18 00:00  01/01/18 00:00








 Intake & Output











 12/31/17 01/01/18 01/02/18





 06:59 06:59 06:59


 


Intake Total 3540 3738 


 


Output Total 1125 700 


 


Balance 2415 3038 


 


Weight 68.5 kg 67.3 kg 











Additional comments: 





Patient refused to be examined





Results


Laboratory Results: 


 





 12/31/17 06:08 





 12/31/17 06:08 





 











  12/31/17





  06:08


 


Sodium  140.9


 


Potassium  4.1


 


Chloride  105


 


Carbon Dioxide  24


 


Anion Gap  12


 


BUN  7


 


Creatinine  0.80


 


Est GFR ( Amer)  > 60


 


Est GFR (Non-Af Amer)  > 60


 


Glucose  82


 


Calcium  8.7


 


Magnesium  2.3


 


Lipase  96.1











Impressions: 


 





Abdomen/Pelvis CT  12/29/17 10:17


IMPRESSION:


1. MILD BOWEL WALL THICKENING THROUGHOUT THE COLON.  THIS IS MOST LIKELY 

ARTIFACT DUE TO NONDISTENTION.  DIFFUSE PANCOLITIS COULD BE A POSSIBILITY 

ALTHOUGH THERE ARE NO OTHER SIGNIFICANT INFLAMMATORY CHANGES OTHERWISE.


2. NO OTHER SIGNIFICANT OR ACUTE FINDING IN THE ABDOMEN OR PELVIS ON CT SCAN 

WITH IV CONTRAST.


 














Assessment & Plan





- Diagnosis


(1) Alcoholic gastritis


Qualifiers: 


   Chronicity: acute   Gastritis bleeding: without bleeding   Qualified Code(s)

: K29.20 - Alcoholic gastritis without bleeding   


Is this a current diagnosis for this admission?: Yes   


Plan: 


Patient refuses to take anything by mouth and to continue Protonix IV








(2) Alcohol abuse


Is this a current diagnosis for this admission?: Yes   


Plan: 


Patient had beenencouraged to quit. He may also need counseling as outpatient.








(3) Colitis


Is this a current diagnosis for this admission?: No   


Plan: 


Ruled out








(4) Vomiting


Qualifiers: 


   Vomiting Intractability: unspecified   Nausea presence: without nausea 


Is this a current diagnosis for this admission?: Yes   


Plan: 


Suspect the patient might be having cyclical vomiting. Barium swallow scheduled 

for tomorrow.  Will continue Zyprexa since also suspect a psychiatric component 

and will improve nausea and appetite.  Continue IV fluids.  Patient had been 

advised that his care will be taken over in the morning by another physician








- Time


Time Spent with patient: Less than 15 minutes


Medications reviewed and adjusted accordingly: Yes


Anticipated discharge: Home





- Inpatient Certification


Based on my medical assessment, after consideration of the patient's 

comorbidities, presenting symptoms, or acuity I expect that the services needed 

warrant INPATIENT care.: Yes


I certify that my determination is in accordance with my understanding of 

Medicare's requirements for reasonable and necessary INPATIENT services [42 CFR 

412.3e].: Yes


Medical Necessity: Need For IV Fluids, Other

## 2018-01-02 LAB
ADD MANUAL DIFF: NO
ALBUMIN SERPL-MCNC: 3.8 G/DL (ref 3.5–5)
ALP SERPL-CCNC: 48 U/L (ref 38–126)
ALT SERPL-CCNC: 46 U/L (ref 21–72)
ANION GAP SERPL CALC-SCNC: 6 MMOL/L (ref 5–19)
AST SERPL-CCNC: 50 U/L (ref 17–59)
BASOPHILS # BLD AUTO: 0 10^3/UL (ref 0–0.2)
BASOPHILS NFR BLD AUTO: 0.8 % (ref 0–2)
BILIRUB DIRECT SERPL-MCNC: 0.2 MG/DL (ref 0–0.4)
BILIRUB SERPL-MCNC: 0.4 MG/DL (ref 0.2–1.3)
BUN SERPL-MCNC: 5 MG/DL (ref 7–20)
CALCIUM: 9.3 MG/DL (ref 8.4–10.2)
CHLORIDE SERPL-SCNC: 107 MMOL/L (ref 98–107)
CO2 SERPL-SCNC: 29 MMOL/L (ref 22–30)
EOSINOPHIL # BLD AUTO: 0.1 10^3/UL (ref 0–0.6)
EOSINOPHIL NFR BLD AUTO: 1.1 % (ref 0–6)
ERYTHROCYTE [DISTWIDTH] IN BLOOD BY AUTOMATED COUNT: 13.1 % (ref 11.5–14)
GLUCOSE SERPL-MCNC: 86 MG/DL (ref 75–110)
HCT VFR BLD CALC: 38.3 % (ref 37.9–51)
HGB BLD-MCNC: 13.1 G/DL (ref 13.5–17)
LYMPHOCYTES # BLD AUTO: 2.3 10^3/UL (ref 0.5–4.7)
LYMPHOCYTES NFR BLD AUTO: 42.2 % (ref 13–45)
MCH RBC QN AUTO: 31.5 PG (ref 27–33.4)
MCHC RBC AUTO-ENTMCNC: 34.2 G/DL (ref 32–36)
MCV RBC AUTO: 92 FL (ref 80–97)
MONOCYTES # BLD AUTO: 0.5 10^3/UL (ref 0.1–1.4)
MONOCYTES NFR BLD AUTO: 9.7 % (ref 3–13)
NEUTROPHILS # BLD AUTO: 2.5 10^3/UL (ref 1.7–8.2)
NEUTS SEG NFR BLD AUTO: 46.2 % (ref 42–78)
PLATELET # BLD: 201 10^3/UL (ref 150–450)
POTASSIUM SERPL-SCNC: 3.6 MMOL/L (ref 3.6–5)
PROT SERPL-MCNC: 6.3 G/DL (ref 6.3–8.2)
RBC # BLD AUTO: 4.16 10^6/UL (ref 4.35–5.55)
SODIUM SERPL-SCNC: 141.9 MMOL/L (ref 137–145)
TOTAL CELLS COUNTED % (AUTO): 100 %
WBC # BLD AUTO: 5.4 10^3/UL (ref 4–10.5)

## 2018-01-02 RX ADMIN — METOCLOPRAMIDE SCH MG: 5 INJECTION, SOLUTION INTRAMUSCULAR; INTRAVENOUS at 23:27

## 2018-01-02 RX ADMIN — HEPARIN SODIUM SCH UNIT: 5000 INJECTION, SOLUTION INTRAVENOUS; SUBCUTANEOUS at 22:10

## 2018-01-02 RX ADMIN — LORAZEPAM PRN MG: 2 INJECTION INTRAMUSCULAR; INTRAVENOUS at 10:44

## 2018-01-02 RX ADMIN — METOCLOPRAMIDE SCH MG: 5 INJECTION, SOLUTION INTRAMUSCULAR; INTRAVENOUS at 16:55

## 2018-01-02 RX ADMIN — MORPHINE SULFATE PRN MG: 10 INJECTION INTRAMUSCULAR; INTRAVENOUS; SUBCUTANEOUS at 13:25

## 2018-01-02 RX ADMIN — DEXTROSE, SODIUM CHLORIDE, AND POTASSIUM CHLORIDE PRN ML: 5; .45; .15 INJECTION INTRAVENOUS at 10:44

## 2018-01-02 RX ADMIN — PANTOPRAZOLE SODIUM SCH MG: 40 INJECTION, POWDER, FOR SOLUTION INTRAVENOUS at 22:10

## 2018-01-02 RX ADMIN — OLANZAPINE SCH MG: 10 INJECTION, POWDER, FOR SOLUTION INTRAMUSCULAR at 22:10

## 2018-01-02 RX ADMIN — HEPARIN SODIUM SCH UNIT: 5000 INJECTION, SOLUTION INTRAVENOUS; SUBCUTANEOUS at 16:54

## 2018-01-02 RX ADMIN — MORPHINE SULFATE PRN MG: 10 INJECTION INTRAMUSCULAR; INTRAVENOUS; SUBCUTANEOUS at 09:02

## 2018-01-02 RX ADMIN — LORAZEPAM PRN MG: 2 INJECTION INTRAMUSCULAR; INTRAVENOUS at 17:20

## 2018-01-02 RX ADMIN — PANTOPRAZOLE SODIUM SCH MG: 40 INJECTION, POWDER, FOR SOLUTION INTRAVENOUS at 08:57

## 2018-01-02 RX ADMIN — HEPARIN SODIUM SCH UNIT: 5000 INJECTION, SOLUTION INTRAVENOUS; SUBCUTANEOUS at 05:11

## 2018-01-02 RX ADMIN — DEXAMETHASONE SODIUM PHOSPHATE PRN MG: 10 INJECTION INTRAMUSCULAR; INTRAVENOUS at 08:57

## 2018-01-02 RX ADMIN — DEXTROSE, SODIUM CHLORIDE, AND POTASSIUM CHLORIDE PRN ML: 5; .45; .15 INJECTION INTRAVENOUS at 17:15

## 2018-01-02 NOTE — RADIOLOGY REPORT (SQ)
EXAM DESCRIPTION:  BARIUM SWALLOW ESOPHAGUS



COMPLETED DATE/TIME:  1/2/2018 8:37 am



REASON FOR STUDY:  vomiting/epigastric pain dysphagia



COMPARISON:  CT abdomen 12/29/2017



TECHNIQUE:  Under fluoroscopic guidance, patient ingested effervescent granules followed by thick and
 thin barium. Fluoroscopic spot images and routine radiographic images acquired and stored on PACS.

12 MM BARIUM TABLET GIVEN: Yes.

No significant delay in passage.



LIMITATIONS:  None.



FLUOROSCOPY TIME:  FLUORO TIME: 0.6 minutes

7 fluoroscopy images saved to PACS.



FINDINGS:  NEUROMUSCULAR COORDINATION OF SWALLOW: Normal. No aspiration.

ESOPHAGEAL MOTILITY: Normal primary peristalsis. No esophageal spasm.

ESOPHAGEAL MUCOSA: Normal mucosa without masses or ulceration.

GASTRO-ESOPHAGEAL JUNCTION: No hiatal hernia.  Moderate gastroesophageal reflux extending to the mid 
esophagus.

NON-GI TRACT STRUCTURES: No significant finding.

OTHER: No other significant finding.



IMPRESSION:  MODERATE GASTROESOPHAGEAL REFLUX OTHERWISE, NORMAL DOUBLE CONTRAST BARIUM SWALLOW.



COMMENT:  Quality :  Final reports for procedures using fluoroscopy that document radiation exp
osure indices, or exposure time and number of fluorographic images (if radiation exposure indices are
 not available)



TECHNICAL DOCUMENTATION:  JOB ID:  [id number]

 2011 [employer]- All Rights Reserved
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
## 2018-01-03 LAB
ADD MANUAL DIFF: NO
ANION GAP SERPL CALC-SCNC: 13 MMOL/L (ref 5–19)
BASOPHILS # BLD AUTO: 0 10^3/UL (ref 0–0.2)
BASOPHILS NFR BLD AUTO: 0.5 % (ref 0–2)
BUN SERPL-MCNC: 5 MG/DL (ref 7–20)
CALCIUM: 9.9 MG/DL (ref 8.4–10.2)
CHLORIDE SERPL-SCNC: 102 MMOL/L (ref 98–107)
CO2 SERPL-SCNC: 27 MMOL/L (ref 22–30)
EOSINOPHIL # BLD AUTO: 0 10^3/UL (ref 0–0.6)
EOSINOPHIL NFR BLD AUTO: 0.8 % (ref 0–6)
ERYTHROCYTE [DISTWIDTH] IN BLOOD BY AUTOMATED COUNT: 13.1 % (ref 11.5–14)
GLUCOSE SERPL-MCNC: 102 MG/DL (ref 75–110)
HCT VFR BLD CALC: 41.6 % (ref 37.9–51)
HGB BLD-MCNC: 14.2 G/DL (ref 13.5–17)
LYMPHOCYTES # BLD AUTO: 1.7 10^3/UL (ref 0.5–4.7)
LYMPHOCYTES NFR BLD AUTO: 27.7 % (ref 13–45)
MCH RBC QN AUTO: 31.1 PG (ref 27–33.4)
MCHC RBC AUTO-ENTMCNC: 34.2 G/DL (ref 32–36)
MCV RBC AUTO: 91 FL (ref 80–97)
MONOCYTES # BLD AUTO: 0.7 10^3/UL (ref 0.1–1.4)
MONOCYTES NFR BLD AUTO: 11.6 % (ref 3–13)
NEUTROPHILS # BLD AUTO: 3.7 10^3/UL (ref 1.7–8.2)
NEUTS SEG NFR BLD AUTO: 59.4 % (ref 42–78)
PLATELET # BLD: 221 10^3/UL (ref 150–450)
POTASSIUM SERPL-SCNC: 3.7 MMOL/L (ref 3.6–5)
RBC # BLD AUTO: 4.56 10^6/UL (ref 4.35–5.55)
SODIUM SERPL-SCNC: 141.9 MMOL/L (ref 137–145)
TOTAL CELLS COUNTED % (AUTO): 100 %
WBC # BLD AUTO: 6.2 10^3/UL (ref 4–10.5)

## 2018-01-03 RX ADMIN — METOCLOPRAMIDE SCH MG: 5 INJECTION, SOLUTION INTRAMUSCULAR; INTRAVENOUS at 13:58

## 2018-01-03 RX ADMIN — HEPARIN SODIUM SCH UNIT: 5000 INJECTION, SOLUTION INTRAVENOUS; SUBCUTANEOUS at 13:57

## 2018-01-03 RX ADMIN — METOCLOPRAMIDE SCH MG: 5 INJECTION, SOLUTION INTRAMUSCULAR; INTRAVENOUS at 23:16

## 2018-01-03 RX ADMIN — MORPHINE SULFATE PRN MG: 10 INJECTION INTRAMUSCULAR; INTRAVENOUS; SUBCUTANEOUS at 15:38

## 2018-01-03 RX ADMIN — DEXAMETHASONE SODIUM PHOSPHATE PRN MG: 10 INJECTION INTRAMUSCULAR; INTRAVENOUS at 07:47

## 2018-01-03 RX ADMIN — DEXTROSE, SODIUM CHLORIDE, AND POTASSIUM CHLORIDE PRN ML: 5; .45; .15 INJECTION INTRAVENOUS at 07:22

## 2018-01-03 RX ADMIN — METOCLOPRAMIDE SCH MG: 5 INJECTION, SOLUTION INTRAMUSCULAR; INTRAVENOUS at 18:04

## 2018-01-03 RX ADMIN — DEXTROSE, SODIUM CHLORIDE, AND POTASSIUM CHLORIDE PRN ML: 5; .45; .15 INJECTION INTRAVENOUS at 18:04

## 2018-01-03 RX ADMIN — OLANZAPINE SCH MG: 10 INJECTION, POWDER, FOR SOLUTION INTRAMUSCULAR at 21:32

## 2018-01-03 RX ADMIN — METOCLOPRAMIDE SCH MG: 5 INJECTION, SOLUTION INTRAMUSCULAR; INTRAVENOUS at 05:52

## 2018-01-03 RX ADMIN — HEPARIN SODIUM SCH UNIT: 5000 INJECTION, SOLUTION INTRAVENOUS; SUBCUTANEOUS at 21:32

## 2018-01-03 RX ADMIN — PANTOPRAZOLE SODIUM SCH MG: 40 INJECTION, POWDER, FOR SOLUTION INTRAVENOUS at 10:06

## 2018-01-03 RX ADMIN — HEPARIN SODIUM SCH UNIT: 5000 INJECTION, SOLUTION INTRAVENOUS; SUBCUTANEOUS at 05:52

## 2018-01-03 RX ADMIN — MORPHINE SULFATE PRN MG: 10 INJECTION INTRAMUSCULAR; INTRAVENOUS; SUBCUTANEOUS at 07:42

## 2018-01-03 NOTE — PDOC PROGRESS REPORT
Subjective


Progress Note for:: 01/03/18


Subjective:: 





Continues with nausea and vomiting.  Patient refused physical exam today


Reason For Visit: 


ABD PAIN,METABOLIC ACIDOSIS,COLITIS,ETOH DEP








Physical Exam


Vital Signs: 


 











Temp Pulse Resp BP Pulse Ox


 


 98.9 F   108 H  12   115/75   100 


 


 01/03/18 08:00  01/03/18 08:00  01/03/18 08:00  01/03/18 08:00  01/03/18 08:00








 Intake & Output











 01/02/18 01/03/18 01/04/18





 06:59 06:59 06:59


 


Intake Total 2620 1365 


 


Output Total 850 1250 


 


Balance 1770 115 


 


Weight 67.3 kg 67.3 kg 














Results


Laboratory Results: 


 





 01/03/18 04:59 





 01/03/18 04:59 





 











  01/03/18 01/03/18





  04:59 04:59


 


WBC  6.2 


 


RBC  4.56 


 


Hgb  14.2 


 


Hct  41.6 


 


MCV  91 


 


MCH  31.1 


 


MCHC  34.2 


 


RDW  13.1 


 


Plt Count  221 


 


Seg Neutrophils %  59.4 


 


Lymphocytes %  27.7 


 


Monocytes %  11.6 


 


Eosinophils %  0.8 


 


Basophils %  0.5 


 


Absolute Neutrophils  3.7 


 


Absolute Lymphocytes  1.7 


 


Absolute Monocytes  0.7 


 


Absolute Eosinophils  0.0 


 


Absolute Basophils  0.0 


 


Sodium   141.9


 


Potassium   3.7


 


Chloride   102


 


Carbon Dioxide   27


 


Anion Gap   13


 


BUN   5 L


 


Creatinine   0.82


 


Est GFR ( Amer)   > 60


 


Est GFR (Non-Af Amer)   > 60


 


Glucose   102


 


Calcium   9.9











Impressions: 


 





Abdomen/Pelvis CT  12/29/17 10:17


IMPRESSION:


1. MILD BOWEL WALL THICKENING THROUGHOUT THE COLON.  THIS IS MOST LIKELY 

ARTIFACT DUE TO NONDISTENTION.  DIFFUSE PANCOLITIS COULD BE A POSSIBILITY 

ALTHOUGH THERE ARE NO OTHER SIGNIFICANT INFLAMMATORY CHANGES OTHERWISE.


2. NO OTHER SIGNIFICANT OR ACUTE FINDING IN THE ABDOMEN OR PELVIS ON CT SCAN 

WITH IV CONTRAST.


 








Esophagus X-Ray  01/02/18 00:00


IMPRESSION:  MODERATE GASTROESOPHAGEAL REFLUX OTHERWISE, NORMAL DOUBLE CONTRAST 

BARIUM SWALLOW.


 














Assessment & Plan





- Diagnosis


(1) Intractable vomiting


Qualifiers: 


   Vomiting type: unspecified   Nausea presence: with nausea   Qualified Code(s)

: R11.2 - Nausea with vomiting, unspecified   


Is this a current diagnosis for this admission?: Yes   


Plan: 


 It may be related to alcoholic gastritis however he continues to have symptoms 

in spite of being started on Reglan.  Will consult gastroenterology.








(2) Alcoholic gastritis


Qualifiers: 


   Chronicity: acute   Gastritis bleeding: without bleeding   Qualified Code(s)

: K29.20 - Alcoholic gastritis without bleeding   


Is this a current diagnosis for this admission?: Yes   


Plan: 


We will continue with the proton pump inhibitor








(3) Alcohol abuse


Is this a current diagnosis for this admission?: Yes   


Plan: 


No evidence for delirium tremens








(4) Colitis


Is this a current diagnosis for this admission?: No   


Plan: 


This was ruled out








- Time


Time Spent with patient: 25-34 minutes





- Inpatient Certification


Medical Necessity: Need For IV Fluids
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
## 2018-01-04 VITALS — DIASTOLIC BLOOD PRESSURE: 92 MMHG | SYSTOLIC BLOOD PRESSURE: 121 MMHG

## 2018-01-04 LAB
ADD MANUAL DIFF: NO
ANION GAP SERPL CALC-SCNC: 11 MMOL/L (ref 5–19)
BASOPHILS # BLD AUTO: 0 10^3/UL (ref 0–0.2)
BASOPHILS NFR BLD AUTO: 0.5 % (ref 0–2)
BUN SERPL-MCNC: 9 MG/DL (ref 7–20)
CALCIUM: 9.8 MG/DL (ref 8.4–10.2)
CHLORIDE SERPL-SCNC: 104 MMOL/L (ref 98–107)
CO2 SERPL-SCNC: 26 MMOL/L (ref 22–30)
EOSINOPHIL # BLD AUTO: 0 10^3/UL (ref 0–0.6)
EOSINOPHIL NFR BLD AUTO: 0.7 % (ref 0–6)
ERYTHROCYTE [DISTWIDTH] IN BLOOD BY AUTOMATED COUNT: 13.3 % (ref 11.5–14)
GLUCOSE SERPL-MCNC: 95 MG/DL (ref 75–110)
HCT VFR BLD CALC: 39.4 % (ref 37.9–51)
HGB BLD-MCNC: 13.3 G/DL (ref 13.5–17)
LYMPHOCYTES # BLD AUTO: 2.9 10^3/UL (ref 0.5–4.7)
LYMPHOCYTES NFR BLD AUTO: 42.2 % (ref 13–45)
MCH RBC QN AUTO: 31.1 PG (ref 27–33.4)
MCHC RBC AUTO-ENTMCNC: 33.8 G/DL (ref 32–36)
MCV RBC AUTO: 92 FL (ref 80–97)
MONOCYTES # BLD AUTO: 0.6 10^3/UL (ref 0.1–1.4)
MONOCYTES NFR BLD AUTO: 9.3 % (ref 3–13)
NEUTROPHILS # BLD AUTO: 3.3 10^3/UL (ref 1.7–8.2)
NEUTS SEG NFR BLD AUTO: 47.3 % (ref 42–78)
PLATELET # BLD: 240 10^3/UL (ref 150–450)
POTASSIUM SERPL-SCNC: 3.6 MMOL/L (ref 3.6–5)
RBC # BLD AUTO: 4.28 10^6/UL (ref 4.35–5.55)
SODIUM SERPL-SCNC: 141.3 MMOL/L (ref 137–145)
TOTAL CELLS COUNTED % (AUTO): 100 %
WBC # BLD AUTO: 7 10^3/UL (ref 4–10.5)

## 2018-01-04 RX ADMIN — METOCLOPRAMIDE SCH MG: 5 INJECTION, SOLUTION INTRAMUSCULAR; INTRAVENOUS at 05:06

## 2018-01-04 RX ADMIN — HEPARIN SODIUM SCH UNIT: 5000 INJECTION, SOLUTION INTRAVENOUS; SUBCUTANEOUS at 05:07

## 2018-01-04 RX ADMIN — DEXTROSE, SODIUM CHLORIDE, AND POTASSIUM CHLORIDE PRN ML: 5; .45; .15 INJECTION INTRAVENOUS at 05:06

## 2018-04-14 ENCOUNTER — HOSPITAL ENCOUNTER (EMERGENCY)
Dept: HOSPITAL 62 - ER | Age: 33
Discharge: HOME | End: 2018-04-14
Payer: SELF-PAY

## 2018-04-14 VITALS — SYSTOLIC BLOOD PRESSURE: 115 MMHG | DIASTOLIC BLOOD PRESSURE: 74 MMHG

## 2018-04-14 DIAGNOSIS — S05.8X9A: ICD-10-CM

## 2018-04-14 DIAGNOSIS — S00.83XA: Primary | ICD-10-CM

## 2018-04-14 DIAGNOSIS — F17.200: ICD-10-CM
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
## 2018-04-14 DIAGNOSIS — H11.33: ICD-10-CM

## 2018-04-14 PROCEDURE — 99284 EMERGENCY DEPT VISIT MOD MDM: CPT

## 2018-04-14 PROCEDURE — 70486 CT MAXILLOFACIAL W/O DYE: CPT

## 2018-04-14 PROCEDURE — 70450 CT HEAD/BRAIN W/O DYE: CPT
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
## 2019-09-23 NOTE — RADIOLOGY REPORT (SQ)
EXAM DESCRIPTION:  CT ABD/PELVIS WITH IV ONLY



COMPLETED DATE/TIME:  9/23/2019 10:41 am



REASON FOR STUDY:  LLQ pain, hematochezia



COMPARISON:  12/29/2017



TECHNIQUE:  CT scan of the abdomen and pelvis performed using helical scanning technique with dynamic
 intravenous contrast injection.  No oral contrast. Images reviewed with lung, soft tissue, and bone 
windows. Reconstructed coronal and sagittal MPR images reviewed. Delayed images for evaluation of the
 urinary system also acquired. All images stored on PACS.

All CT scanners at this facility use dose modulation, iterative reconstruction, and/or weight based d
osing when appropriate to reduce radiation dose to as low as reasonably achievable (ALARA).

[facility]: Dose Right  [facility]: CareDose    [facility]: Dose Right    [facility]: Teradose 4D    [facility]: Smart Technologies



CONTRAST TYPE AND DOSE:  contrast/concentration: Isovue 350.00 mg/ml; Total Contrast Delivered: 75.0 
ml; Total Saline Delivered: 65.0 ml



RENAL FUNCTION:  None required. The patient is less than 50 years old.



RADIATION DOSE:  CT Rad equipment meets quality standard of care and radiation dose reduction techniq
ues were employed. CTDIvol: 5.0 - 6.2 mGy. DLP: 621 mGy-cm..



LIMITATIONS:  None.



FINDINGS:  LOWER CHEST: No significant findings. No nodules or infiltrates.

LIVER: Normal size. No masses.  No dilated ducts.

SPLEEN: Normal size. No focal lesions.

PANCREAS: No masses. No significant calcifications. No adjacent inflammation or peripancreatic fluid 
collections. Pancreatic duct not dilated.

GALLBLADDER: No identified stones by CT criteria. No inflammatory changes to suggest cholecystitis.

ADRENAL GLANDS: No significant masses or asymmetry.

RIGHT KIDNEY AND URETER: No solid masses.   No significant calcifications.   No hydronephrosis or hyd
roureter.

LEFT KIDNEY AND URETER: No solid masses.   No significant calcifications.   No hydronephrosis or hydr
oureter.

AORTA AND VESSELS: No aneurysm. No dissection. Renal arteries, SMA, celiac without stenosis.

RETROPERITONEUM: No retroperitoneal adenopathy, hemorrhage or masses.

BOWEL AND PERITONEAL CAVITY: No masses or inflammatory changes. No free fluid or peritoneal masses.

APPENDIX: Normal.

PELVIS: No mass.  No free fluid. Normal bladder.

ABDOMINAL WALL: No masses. No hernias.

BONES: No significant or acute findings.

OTHER: No other significant finding.



IMPRESSION:  No CT abnormality of the abdomen or pelvis to explain left lower quadrant abdominal pain
.



TECHNICAL DOCUMENTATION:  JOB ID:  [id number]

Quality ID # [id number]: Final reports with documentation of one or more dose reduction techniques (e.g., Au
tomated exposure control, adjustment of the mA and/or kV according to patient size, use of iterative 
reconstruction technique)

 2011 [employer]- All Rights Reserved



Reading location - IP/workstation name: [email or web address]
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
## 2020-05-28 NOTE — RADIOLOGY REPORT (SQ)
EXAM DESCRIPTION:  ACUTE ABDOMEN SERIES



IMAGES COMPLETED DATE/TIME:  5/28/2020 4:39 pm



REASON FOR STUDY:  nausea/vomiting



COMPARISON:  None.



NUMBER OF VIEWS:  Three views.



TECHNIQUE:  Frontal chest, supine abdomen and upright/decubitus abdomen radiographic images acquired.




LIMITATIONS:  None.



FINDINGS:  CHEST: Lungs clear of infiltrates.

FREE AIR: None. No abnormal gas collections.

BOWEL GAS PATTERN: Nonobstructive pattern. No dilated loops or air fluid levels.

CALCIFICATIONS: No suspicious calcifications.

HARDWARE: None in the abdomen.

SOFT TISSUES: No gross mass or suggestion of organomegaly.

BONES: No acute fracture.  No worrisome bone lesions.

OTHER: No other significant finding.



IMPRESSION:  NO RADIOGRAPHIC EVIDENCE FOR ACUTE ABDOMINAL DISEASE.



TECHNICAL DOCUMENTATION:  JOB ID:  [id number]

 2011 [employer]- All Rights Reserved



Reading location - IP/workstation name: [email or web address]
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
## 2020-05-30 NOTE — ER DOCUMENT REPORT
Doctor's Note


Notes: 





05/30/20 17:30


34-year-old male who presents with some repeat vomiting abdominal pain.  Patient

was seen here yesterday as well as today.  Patient did have a history of cyclic 

vomiting syndrome.  Marijuana was positive on drug screen.  Patient does have 

multiple white blood cells in the urine analysis.  No testicular pain or 

swelling.  No vomiting here in the patient's abdomen exam is improved from prior

reports. 





Given the white blood cell count in the urine with no obvious signs of kidney 

stone on CT scan, afebrile with stable vital signs, I will provide a dose of 

Rocephin and azithromycin and send off chlamydia and gonorrhea testing as well 

as a urine culture and an RPR.  Patient feels very comfortable going home.  I 

believe this is a reasonable option.

## 2020-05-30 NOTE — RADIOLOGY REPORT (SQ)
EXAM DESCRIPTION:  CT ABD/PELVIS WITH IV ONLY



IMAGES COMPLETED DATE/TIME:  5/30/2020 2:04 pm



REASON FOR STUDY:  RLQ pain

 .



COMPARISON:  9/23/2019



TECHNIQUE:  CT scan of the abdomen and pelvis performed using helical scanning technique with dynamic
 intravenous contrast injection.  No oral contrast. Images reviewed with lung, soft tissue, and bone 
windows. Reconstructed coronal and sagittal MPR images reviewed. Delayed images for evaluation of the
 urinary system also acquired. All images stored on PACS.

All CT scanners at this facility use dose modulation, iterative reconstruction, and/or weight based d
osing when appropriate to reduce radiation dose to as low as reasonably achievable (ALARA).

[facility]: Dose Right  [facility]: CareDose    [facility]: Dose Right    [facility]: Teradose 4D    [facility]: Smart Technologies



CONTRAST TYPE AND DOSE:  contrast/concentration: Isovue 350.00 mg/ml; Total Contrast Delivered: 94.0 
ml; Total Saline Delivered: 71.0 ml



RENAL FUNCTION:  GFR > 60.



RADIATION DOSE:  CT Rad equipment meets quality standard of care and radiation dose reduction techniq
ues were employed. CTDIvol: 4.8 - 5.0 mGy. DLP: 532 mGy-cm..



LIMITATIONS:  None.



FINDINGS:  LOWER CHEST: No significant findings. No nodules or infiltrates.

LIVER: Normal size. No masses.  No dilated ducts.

SPLEEN: Normal size. No focal lesions.

PANCREAS: No masses. No significant calcifications. No adjacent inflammation or peripancreatic fluid 
collections. Pancreatic duct not dilated.

GALLBLADDER: No identified stones by CT criteria. No inflammatory changes to suggest cholecystitis.

ADRENAL GLANDS: No significant masses or asymmetry.

RIGHT KIDNEY AND URETER: No solid masses.   No significant calcifications.   No hydronephrosis or hyd
roureter.

LEFT KIDNEY AND URETER: No solid masses.   No significant calcifications.   No hydronephrosis or hydr
oureter.

AORTA AND VESSELS: No aneurysm. No dissection. Renal arteries, SMA, celiac without stenosis.

RETROPERITONEUM: No retroperitoneal adenopathy, hemorrhage or masses.

BOWEL AND PERITONEAL CAVITY: There is underdistention of the entire colon.  Scattered colonic diverti
cula without evidence of diverticulitis.  No bowel obstruction.  Prominent fat within the bowel wall 
particularly in the ascending colon, possibly representing underlying chronic inflammatory change.  N
o acute inflammatory change.  No ascites or pneumoperitoneum.

APPENDIX: Normal.

PELVIS: No mass.  No free fluid. Normal bladder.

ABDOMINAL WALL: No masses. No hernias.

BONES: No significant or acute findings.

OTHER: No other significant finding.



IMPRESSION:

1. No acute abnormality in the abdomen or pelvis to explain the patient's symptoms.  The appendix is 
normal.

2. Prominent fat in the colon wall particularly in the ascending colon, which can be seen in associat
ion with chronic inflammatory bowel disease such as Crohn disease.  Clinical correlation is recommend
ed.

3. Mild diverticulosis without evidence of diverticulitis.  No acute inflammatory change.



TECHNICAL DOCUMENTATION:  JOB ID:  [id number]

Quality ID # [id number]: Final reports with documentation of one or more dose reduction techniques (e.g., Au
tomated exposure control, adjustment of the mA and/or kV according to patient size, use of iterative 
reconstruction technique)

 2011 [employer] All Rights Reserved



Reading location - IP/workstation name: [email or web address]

## 2020-08-06 NOTE — RADIOLOGY REPORT (SQ)
EXAM DESCRIPTION:  ACUTE ABDOMEN SERIES



IMAGES COMPLETED DATE/TIME:  8/6/2020 10:57 am



REASON FOR STUDY:  nausea/vomiting   .



COMPARISON:  None.



NUMBER OF VIEWS:  Three views.



TECHNIQUE:  Frontal chest, supine abdomen and upright/decubitus abdomen radiographic images acquired.




LIMITATIONS:  None.



FINDINGS:  CHEST: Lungs clear of infiltrates.

FREE AIR: None. No abnormal gas collections.

BOWEL GAS PATTERN: Nonobstructive pattern. No dilated loops or air fluid levels.

CALCIFICATIONS: No suspicious calcifications.

HARDWARE: None in the abdomen.

SOFT TISSUES: No gross mass or suggestion of organomegaly.

BONES: No acute fracture.  No worrisome bone lesions.

OTHER: No other significant finding.



IMPRESSION:  No acute cardiopulmonary disease.  Nonobstructive bowel gas pattern.



TECHNICAL DOCUMENTATION:  JOB ID:  [id number]

 2011 [employer]- All Rights Reserved



Reading location - IP/workstation name: [email or web address]

## 2020-08-06 NOTE — RADIOLOGY REPORT (SQ)
EXAM DESCRIPTION: 



CT ABDOMEN PELVIS WITH IV CONTRAST



COMPLETED DATE/TME:  08/06/2020 00:00



CLINICAL HISTORY: 



35 years, Male, abd pain/nausea/vomiting



COMPARISON:

Previous studies from 5/30/2020, 9/23/2019 and 12/29/2017.



TECHNIQUE:

Axial images with 100 mL of Omnipaque 350. Oral contrast

administered. Sagittal coronal reconstruction.  Images stored on

PACS.

 

All CT scanners at this facility use dose modulation, iterative

reconstruction, and/or weight based dosing when appropriate to

reduce radiation dose to as low as reasonably achievable (ALARA).







FINDINGS:



Mild cardiomegaly unchanged. Lung bases are unremarkable. No

suspicious focal hepatic lesions. Spleen not enlarged. Biliary

system, pancreas, adrenal glands, kidneys, para-aortic regions

are unremarkable. Early atherosclerotic aorta. Suspected moderate

narrowing at the origin of the celiac artery probably caused by

median arcuate ligament.



Stomach demonstrates minimal thickening of the wall of the

antrum. Small bowel loops are unremarkable. Mild colonic

thickening in the cecum and ascending portion. Series 3 images

50-57. Mild thickening of the transverse colon, image 36. No

definite left colon thickening.



CT of the pelvis demonstrates minimally distended urinary bladder

and mildly enlarged prostate. Question rectosigmoid thickening

versus artifact from contraction. No free fluid or adenopathy. No

suspicious bony lesions.





IMPRESSION:





1. Similar to previous studies, there is suggestion of mild

thickening of the ascending and transverse colon. Suggestive of

mild chronic colitis. Rule out inflammatory bowel disease. Very

minimal thickening in the rectosigmoid area is more likely

artifact from contraction. No suspicious small bowel abnormality.

Nonspecific minimal thickening of the antral wall of the stomach.

2. Incidental moderate narrowing at the origin of the celiac

artery presumably related to median arcuate ligament. May or may

not be hemodynamically significant.
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
## 2020-09-02 NOTE — RADIOLOGY REPORT (SQ)
EXAM DESCRIPTION:  CT HEAD WITHOUT



IMAGES COMPLETED DATE/TIME:  9/2/2020 4:19 pm



REASON FOR STUDY:  fall, head injury



COMPARISON:  CT of the head without contrast from 4/14/2018.



TECHNIQUE:  Axial images acquired through the brain without intravenous contrast.  Images reviewed wi
th bone, brain and subdural windows.  Additional sagittal and coronal reconstructions were generated.
 Images stored on PACS.

All CT scanners at this facility use dose modulation, iterative reconstruction, and/or weight based d
osing when appropriate to reduce radiation dose to as low as reasonably achievable (ALARA).

[facility]: Dose Right  [facility]: CareDose    [facility]: Dose Right    [facility]: Teradose 4D    [facility]: Smart Technologies



RADIATION DOSE:  CT Rad equipment meets quality standard of care and radiation dose reduction techniq
ues were employed. CTDIvol: 53.2 mGy. DLP: 1124 mGy-cm.



LIMITATIONS:  None.



FINDINGS:  There is no acute intracranial hemorrhage, vascular territorial infarct, extra-axial fluid
 collection, mass effect or midline shift.  The gray-white matter differentiation is preserved.  The 
caliber of the ventricles is concordant with the degree of sulcation.  There is no effacement of the 
cerebral sulci or basal subarachnoid cisterns.

The orbits and globes are intact.  The paranasal sinuses are clear.  There is no fracture of the calv
arium.



IMPRESSION:  No acute intracranial abnormality.

EVIDENCE OF ACUTE STROKE: NO.



COMMENT:  Quality ID # [id number]: Final reports with documentation of one or more dose reduction techniques
 (e.g., Automated exposure control, adjustment of the mA and/or kV according to patient size, use of 
iterative reconstruction technique)



TECHNICAL DOCUMENTATION:  JOB ID:  [id number]

 2011 [employer]- All Rights Reserved



Reading location - IP/workstation name: [email or web address]
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
